# Patient Record
Sex: FEMALE | Race: WHITE | URBAN - METROPOLITAN AREA
[De-identification: names, ages, dates, MRNs, and addresses within clinical notes are randomized per-mention and may not be internally consistent; named-entity substitution may affect disease eponyms.]

---

## 2022-10-11 ENCOUNTER — APPOINTMENT (OUTPATIENT)
Dept: CT IMAGING | Age: 47
DRG: 494 | End: 2022-10-11

## 2022-10-11 ENCOUNTER — HOSPITAL ENCOUNTER (INPATIENT)
Age: 47
LOS: 2 days | Discharge: HOME | DRG: 494 | End: 2022-10-13
Attending: EMERGENCY MEDICINE | Admitting: ORTHOPAEDIC SURGERY

## 2022-10-11 ENCOUNTER — APPOINTMENT (OUTPATIENT)
Dept: GENERAL RADIOLOGY | Age: 47
DRG: 494 | End: 2022-10-11

## 2022-10-11 DIAGNOSIS — S82.401A CLOSED FRACTURE OF RIGHT TIBIA AND FIBULA, INITIAL ENCOUNTER: Primary | ICD-10-CM

## 2022-10-11 DIAGNOSIS — S82.201A CLOSED FRACTURE OF RIGHT TIBIA AND FIBULA, INITIAL ENCOUNTER: Primary | ICD-10-CM

## 2022-10-11 PROBLEM — S82.241G: Status: ACTIVE | Noted: 2022-10-11

## 2022-10-11 LAB
ANION GAP SERPL CALCULATED.3IONS-SCNC: 12 MMOL/L (ref 3–16)
BUN BLDV-MCNC: 5 MG/DL (ref 7–20)
CALCIUM SERPL-MCNC: 8.8 MG/DL (ref 8.3–10.6)
CHLORIDE BLD-SCNC: 103 MMOL/L (ref 99–110)
CO2: 24 MMOL/L (ref 21–32)
CREAT SERPL-MCNC: <0.5 MG/DL (ref 0.6–1.1)
GFR AFRICAN AMERICAN: >60
GFR NON-AFRICAN AMERICAN: >60
GLUCOSE BLD-MCNC: 104 MG/DL (ref 70–99)
HCT VFR BLD CALC: 34.4 % (ref 36–48)
HEMATOLOGY PATH CONSULT: YES
HEMOGLOBIN: 12 G/DL (ref 12–16)
MCH RBC QN AUTO: 38.3 PG (ref 26–34)
MCHC RBC AUTO-ENTMCNC: 34.7 G/DL (ref 31–36)
MCV RBC AUTO: 110.1 FL (ref 80–100)
PDW BLD-RTO: 13.4 % (ref 12.4–15.4)
PLATELET # BLD: 247 K/UL (ref 135–450)
PLATELET SLIDE REVIEW: ADEQUATE
PMV BLD AUTO: 7.4 FL (ref 5–10.5)
POTASSIUM SERPL-SCNC: 3.3 MMOL/L (ref 3.5–5.1)
RBC # BLD: 3.13 M/UL (ref 4–5.2)
SLIDE REVIEW: ABNORMAL
SODIUM BLD-SCNC: 139 MMOL/L (ref 136–145)
WBC # BLD: 8.2 K/UL (ref 4–11)

## 2022-10-11 PROCEDURE — 6360000002 HC RX W HCPCS: Performed by: NURSE PRACTITIONER

## 2022-10-11 PROCEDURE — 6370000000 HC RX 637 (ALT 250 FOR IP): Performed by: NURSE PRACTITIONER

## 2022-10-11 PROCEDURE — 99285 EMERGENCY DEPT VISIT HI MDM: CPT

## 2022-10-11 PROCEDURE — 1200000000 HC SEMI PRIVATE

## 2022-10-11 PROCEDURE — 85027 COMPLETE CBC AUTOMATED: CPT

## 2022-10-11 PROCEDURE — 80048 BASIC METABOLIC PNL TOTAL CA: CPT

## 2022-10-11 PROCEDURE — 96374 THER/PROPH/DIAG INJ IV PUSH: CPT

## 2022-10-11 PROCEDURE — 73700 CT LOWER EXTREMITY W/O DYE: CPT

## 2022-10-11 PROCEDURE — 73560 X-RAY EXAM OF KNEE 1 OR 2: CPT

## 2022-10-11 PROCEDURE — 29505 APPLICATION LONG LEG SPLINT: CPT

## 2022-10-11 PROCEDURE — 73610 X-RAY EXAM OF ANKLE: CPT

## 2022-10-11 PROCEDURE — 96376 TX/PRO/DX INJ SAME DRUG ADON: CPT

## 2022-10-11 RX ORDER — OXYCODONE HYDROCHLORIDE 5 MG/1
5 TABLET ORAL EVERY 4 HOURS PRN
Status: DISCONTINUED | OUTPATIENT
Start: 2022-10-11 | End: 2022-10-13 | Stop reason: HOSPADM

## 2022-10-11 RX ORDER — SODIUM CHLORIDE 9 MG/ML
INJECTION, SOLUTION INTRAVENOUS CONTINUOUS
Status: DISCONTINUED | OUTPATIENT
Start: 2022-10-11 | End: 2022-10-12

## 2022-10-11 RX ORDER — OXYCODONE HYDROCHLORIDE 10 MG/1
10 TABLET ORAL EVERY 4 HOURS PRN
Status: DISCONTINUED | OUTPATIENT
Start: 2022-10-11 | End: 2022-10-13 | Stop reason: HOSPADM

## 2022-10-11 RX ORDER — MORPHINE SULFATE 4 MG/ML
4 INJECTION, SOLUTION INTRAMUSCULAR; INTRAVENOUS ONCE
Status: COMPLETED | OUTPATIENT
Start: 2022-10-11 | End: 2022-10-11

## 2022-10-11 RX ORDER — ENOXAPARIN SODIUM 100 MG/ML
30 INJECTION SUBCUTANEOUS ONCE
Status: COMPLETED | OUTPATIENT
Start: 2022-10-11 | End: 2022-10-11

## 2022-10-11 RX ORDER — OXYCODONE HYDROCHLORIDE 5 MG/1
5 TABLET ORAL EVERY 4 HOURS PRN
Status: DISCONTINUED | OUTPATIENT
Start: 2022-10-11 | End: 2022-10-11 | Stop reason: SDUPTHER

## 2022-10-11 RX ORDER — SODIUM CHLORIDE 9 MG/ML
INJECTION, SOLUTION INTRAVENOUS CONTINUOUS
Status: DISCONTINUED | OUTPATIENT
Start: 2022-10-12 | End: 2022-10-12

## 2022-10-11 RX ORDER — ONDANSETRON 2 MG/ML
4 INJECTION INTRAMUSCULAR; INTRAVENOUS EVERY 6 HOURS PRN
Status: DISCONTINUED | OUTPATIENT
Start: 2022-10-11 | End: 2022-10-13 | Stop reason: HOSPADM

## 2022-10-11 RX ORDER — OXYCODONE HYDROCHLORIDE 10 MG/1
10 TABLET ORAL EVERY 4 HOURS PRN
Status: DISCONTINUED | OUTPATIENT
Start: 2022-10-11 | End: 2022-10-11 | Stop reason: SDUPTHER

## 2022-10-11 RX ORDER — MORPHINE SULFATE 2 MG/ML
2 INJECTION, SOLUTION INTRAMUSCULAR; INTRAVENOUS
Status: DISCONTINUED | OUTPATIENT
Start: 2022-10-11 | End: 2022-10-11 | Stop reason: SDUPTHER

## 2022-10-11 RX ORDER — OMEPRAZOLE 20 MG/1
20 CAPSULE, DELAYED RELEASE ORAL DAILY
COMMUNITY

## 2022-10-11 RX ORDER — MORPHINE SULFATE 2 MG/ML
2 INJECTION, SOLUTION INTRAMUSCULAR; INTRAVENOUS
Status: DISCONTINUED | OUTPATIENT
Start: 2022-10-11 | End: 2022-10-13 | Stop reason: HOSPADM

## 2022-10-11 RX ADMIN — MORPHINE SULFATE 4 MG: 4 INJECTION, SOLUTION INTRAMUSCULAR; INTRAVENOUS at 16:19

## 2022-10-11 RX ADMIN — ONDANSETRON 4 MG: 2 INJECTION INTRAMUSCULAR; INTRAVENOUS at 17:36

## 2022-10-11 RX ADMIN — MORPHINE SULFATE 2 MG: 2 INJECTION, SOLUTION INTRAMUSCULAR; INTRAVENOUS at 21:47

## 2022-10-11 RX ADMIN — ENOXAPARIN SODIUM 30 MG: 100 INJECTION SUBCUTANEOUS at 19:11

## 2022-10-11 RX ADMIN — OXYCODONE HYDROCHLORIDE 10 MG: 10 TABLET ORAL at 18:50

## 2022-10-11 RX ADMIN — MORPHINE SULFATE 4 MG: 4 INJECTION, SOLUTION INTRAMUSCULAR; INTRAVENOUS at 13:56

## 2022-10-11 ASSESSMENT — PAIN DESCRIPTION - ORIENTATION
ORIENTATION: RIGHT

## 2022-10-11 ASSESSMENT — PAIN DESCRIPTION - ONSET: ONSET: ON-GOING

## 2022-10-11 ASSESSMENT — PAIN - FUNCTIONAL ASSESSMENT
PAIN_FUNCTIONAL_ASSESSMENT: PREVENTS OR INTERFERES SOME ACTIVE ACTIVITIES AND ADLS
PAIN_FUNCTIONAL_ASSESSMENT: 0-10

## 2022-10-11 ASSESSMENT — PAIN DESCRIPTION - LOCATION
LOCATION: ANKLE

## 2022-10-11 ASSESSMENT — PAIN DESCRIPTION - FREQUENCY
FREQUENCY: CONTINUOUS
FREQUENCY: CONTINUOUS

## 2022-10-11 ASSESSMENT — PAIN DESCRIPTION - DESCRIPTORS
DESCRIPTORS: THROBBING
DESCRIPTORS: THROBBING;STABBING
DESCRIPTORS: ACHING;DISCOMFORT;SHARP
DESCRIPTORS: THROBBING
DESCRIPTORS: THROBBING

## 2022-10-11 ASSESSMENT — PAIN SCALES - GENERAL
PAINLEVEL_OUTOF10: 8
PAINLEVEL_OUTOF10: 8
PAINLEVEL_OUTOF10: 9
PAINLEVEL_OUTOF10: 10
PAINLEVEL_OUTOF10: 8
PAINLEVEL_OUTOF10: 10

## 2022-10-11 ASSESSMENT — PAIN DESCRIPTION - PAIN TYPE
TYPE: ACUTE PAIN
TYPE: ACUTE PAIN

## 2022-10-11 ASSESSMENT — PAIN SCALES - WONG BAKER: WONGBAKER_NUMERICALRESPONSE: 0

## 2022-10-11 NOTE — ED NOTES
Patient vomited up small amount of water, zofran given per order      Saranya Valdes, RN  10/11/22 5777

## 2022-10-11 NOTE — ED TRIAGE NOTES
Ems states patient fell yesterday and today ankle is swollen and blue, good pms.  100mcg of fentanyl given en route IM

## 2022-10-11 NOTE — CARE COORDINATION
INITIAL CASE MANAGEMENT ASSESSMENT    Reviewed chart, met with patient to assess possible discharge needs. Explained Case Management role/services. Living Situation: Lives alone in house with 1 YOSELIN has medium dog. Friend watching dog. ADLs: Independent PTA, Massage Therapist.     DME: Done    PT/OT Recs: TBD     Active Services: None     Transportation: Active      Medications: [facility]    PCP: may need      HD/PD: N/A    PLAN/COMMENTS: return home. Completed Advanced Directives. Provided Home care and SNF list.      The Plan for Transition of Care is related to the following treatment goals: return home    The Patient and/or patient representative Friend was provided with a choice of provider and agrees   with the discharge plan. [x] Yes [] No    Freedom of choice list was provided with basic dialogue that supports the patient's individualized plan of care/goals, treatment preferences and shares the quality data associated with the providers. [x] Yes [] No    SW/CM provided contact information for patient or family to call with any questions. SW/CM will follow and assist as needed.

## 2022-10-11 NOTE — ACP (ADVANCE CARE PLANNING)
Advance Care Planning     Advance Care Planning Activator (Inpatient)  Conversation Note      Date of ACP Conversation: 10/11/2022     Conversation Conducted with: Patient with Decision Making Capacity    ACP Activator: Ez Guerreros, [address]:     Current Designated Health Care Decision Maker:     Primary Decision Maker: Colin Martinsr - Parent - [phone]    Secondary Decision Maker: Natalie Lennon - Parent - [phone]    Today we documented Decision Maker(s) consistent with ACP documents on file.     Length of ACP Conversation in minutes:      Conversation Outcomes:  [x] ACP discussion completed  [] Existing advance directive reviewed with patient; no changes to patient's previously recorded wishes  [x] New Advance Directive completed  [] Portable Do Not Rescitate prepared for Provider review and signature  [] POLST/POST/MOLST/MOST prepared for Provider review and signature      Follow-up plan:    [] Schedule follow-up conversation to continue planning  [] Referred individual to Provider for additional questions/concerns   [] Advised patient/agent/surrogate to review completed ACP document and update if needed with changes in condition, patient preferences or care setting    [] This note routed to one or more involved healthcare providers

## 2022-10-11 NOTE — ED PROVIDER NOTES
[address]  [address] [address]  Dept: [phone]  Loc: [address] COMPLAINT    Chief Complaint   Patient presents with    Fall       HPI    Cory Dalton is a 52 y.o. female who presents to department with complaints of right ankle pain. Patient states that she was walking and missed a curb stepping up. She did not fall but she rolled the ankle. She had pain in it but was able to ambulate. She went home and at 3:00 in the morning she got up to go to the bathroom and was having severe pain and difficulty walking. This morning she is unable to ambulate at all on it. She has never had injuries to this ankle before. She denies numbness or paresthesias to the extremity. She denies any other injury. REVIEW OF SYSTEMS    Neurologic: no LOC, no Head injury  Cardiac: No Chest Pain, no syncope  Respiratory: No difficulty breathing  GI: No abdominal pain  Musculoskeletal: see HPI  All other systems reviewed and are negative. PAST MEDICAL & SURGICAL HISTORY    No past medical history on file. No past surgical history on file. CURRENT MEDICATIONS  (may include discharge medications prescribed in the ED)      ALLERGIES    Allergies   Allergen Reactions    Pcn [Penicillins]        SOCIAL & FAMILY HISTORY    Social History     Socioeconomic History    Marital status: Single     Spouse name: None    Number of children: None    Years of education: None    Highest education level: None   Tobacco Use    Smoking status: Never    Smokeless tobacco: Never     No family history on file.     PHYSICAL EXAM    VITAL SIGNS: /87   Pulse 89   Temp 97.9 °F (36.6 °C) (Oral)   Resp 14   Ht 5' 3\" (1.6 m)   Wt 152 lb 8.9 oz (69.2 kg)   LMP 09/21/2022 (Approximate)   SpO2 95%   BMI 27.02 kg/m²    Constitutional:  Well developed, well nourished, no acute distress   HENT: atraumatic, no trismus  Neck: supple, no JVD, no posterior neck tenderness  Respiratory:  Lungs clear to auscultation bilaterally, no retractions   Cardiovascular:  regular rate, no murmurs  GI:  Soft, nontender, normal bowel sounds  Musculoskeletal:  No edema, patient is unable to flex and extend the right ankle. No range of motion due to pain. She is able to wiggle left toes. No obvious deformity to the knee. Is no pain with range of motion to the knee. No obvious deformity to the ankle but she does have moderate amount soft tissue swelling of circumferential to the ankle extending into the lower leg  Vascular: Posttibial pulses 2+ equal bilaterally. Brisk capillary refill to the toes. The extremity is warm  Integument:  Well hydrated, and is intact but she does have a large purple ecchymosis developing in the right ankle and lower extremity  Neurologic:  Alert & oriented, no slurred speech  Psych: Pleasant affect, no hallucinations    RADIOLOGY  (interpreted by the radiologist)  XR KNEE RIGHT (1-2 VIEWS)   Preliminary Result   No evidence of acute fracture. Follow-up examination recommended in 7-10 days   if clinically indicated. XR ANKLE RIGHT (MIN 3 VIEWS)   Final Result   Acute comminuted and mildly displaced fractures of the mid to distal tibia as   well as the distal fibula with overlying soft tissue swelling. CT ANKLE RIGHT WO CONTRAST    (Results Pending)       ED COURSE & MEDICAL DECISION MAKING    Pertinent studies reviewed and interpreted.  (See chart for details)  See chart for details of medications ordered    Medications   enoxaparin Sodium (LOVENOX) injection 30 mg (has no administration in time range)   morphine (PF) injection 2 mg (has no administration in time range)   ondansetron (ZOFRAN) injection 4 mg (has no administration in time range)   0.9 % sodium chloride infusion (has no administration in time range)   oxyCODONE (ROXICODONE) immediate release tablet 5 mg (has no administration in time range)     Or   oxyCODONE HCl (OXY-IR) immediate release tablet 10 mg (has no administration in time range)   morphine (PF) injection 4 mg (4 mg IntraVENous Given 10/11/22 1356)   morphine (PF) injection 4 mg (4 mg IntraVENous Given 10/11/22 1619)     This patient was seen and evaluated by myself and my attending physician Dr. Leonel Clemente    Differential Diagnosis: Fracture, Dislocation, ligamentous injury, other soft tissue injury, visceral injury, neurologic injury, other. Patient is afebrile and nontoxic in appearance. Plain films as above. No evidence of neurovascular injury on exam.    She was given fentanyl in route via EMS. It was ineffective. On arrival I ordered for her to have morphine. It did help some. She was given another round of morphine. She can eat today. N.p.o. after midnight. I spoke with orthopedics who saw the patient in the ED. Plan for surgery tomorrow per Dr. Denise Yañez. The splint was placed by my attending physician Dr. Leonel Clemente. She remained neurovascularly intact after the splint was applied. Please see his note for details of the splint application. She was updated on the plan of care and she agrees. FINAL IMPRESSION    1.  Closed fracture of right tibia and fibula, initial encounter        PLAN  Admission      (Please note that this note was completed with a voice recognition program.  Every attempt was made to edit the dictations, but inevitably there remain words that are mis-transcribed.)            Susy Campbell, NEISHA - CNP  10/11/22 [address] [facility], APRN - CNP  10/11/22 4875

## 2022-10-11 NOTE — ED TRIAGE NOTES
Patient states she fell of a curb while walking right ankle pain and swelling.  Patient states she feels the bones moving in her ankle

## 2022-10-11 NOTE — PROGRESS NOTES
Pharmacy Medication Reconciliation Note     List of medications patient is currently taking is complete. Source of information:   1. Conversation with patient   2. EMR    Notes regarding home medications:   1. Patient states the only medication she takes is an over the counter acid reducer. Patient can't remember the name or dose - just that the instructions say to take once daily.  Omeprazole 20 mg daily added to list.      Janel Colmenares PharmD  10/11/2022 5:12 PM

## 2022-10-11 NOTE — ED PROVIDER NOTES
identity confirmed:  Verbally with patient and arm band  Pre-procedure details:     Distal neurologic exam:  Normal    Distal perfusion: distal pulses strong    Procedure details:     Location:  Leg    Leg location:  L lower leg    Splint type:  Long leg and ankle stirrup    Supplies:  Elastic bandage, fiberglass and cotton padding    Attestation: Splint applied and adjusted personally by me    Post-procedure details:     Distal neurologic exam:  Normal    Distal perfusion: distal pulses strong      Procedure completion:  Tolerated well, no immediate complications    Post-procedure imaging: not applicable      1.  Closed fracture of right tibia and fibula, initial encounter      Disposition:  Admit        (Please note that portions of this note were completed with a voice recognition program.  Efforts were made to edit the dictations but occasionally words are mis-transcribed.)    Moncho Puente MD  Attending Emergency Physician        Ruth Fernandez MD  10/11/22 [address], [place]  10/11/22 [address], [place]  10/11/22 0944

## 2022-10-11 NOTE — CONSULTS
[facility] Orthopedic Surgery  Consult Note    This patient is seen in consultation at the request of Cleo Frankel NP    Reason for Consult:  right tib/fib shaft fx, right ankle fx    CHIEF COMPLAINT:  right leg pain    History Obtained From:  patient, electronic medical record    HISTORY OF PRESENT ILLNESS:    The patient is a 52 y.o. female who presents with right ankle pain. Pain is described in right lower leg and ankle and with the intensity of severe. Pain is described as aching, throbbing. Discomfort is constant. She reports walking and missing the curb step and rolling the right ankle. She went home but awoke during the night with severe right lower leg pain and came to ER. She is alert and oriented. NO other complaints. She is alert and oriented and pleasant. Having tremors. Past Medical History:    No past medical history on file. Past Surgical History:    No past surgical history on file. Social History     Tobacco Use    Smoking status: Not on file    Smokeless tobacco: Not on file   Substance Use Topics    Alcohol use: Not on file       No family history on file. Current Medications:   No current facility-administered medications for this encounter. Allergies:  ALLERGY@    REVIEW OF SYSTEMS:    CONSTITUTIONAL:  negative for  fevers, chills, and malaise  MUSCULOSKELETAL:  positive for  myalgias, arthralgias, and pain  All other ROS reviewed in chart or with patient or family and are grossly negative. PHYSICAL EXAM:    VITALS:  /87   Pulse 89   Temp 97.9 °F (36.6 °C) (Oral)   Resp 14   Ht 5' 3\" (1.6 m)   Wt 152 lb 8.9 oz (69.2 kg)   LMP 09/21/2022 (Approximate)   SpO2 95%   BMI 27.02 kg/m²     MUSCULOSKELETAL:  right foot NVI. Wiggles toes to command. Able to plantarflex and dorsiflex ankle Pedal pulses are palpable. Right lower leg with swelling and bruising noted. Right ankle with swelling and bruising.    NEUROLOGIC:   Sensory:    Touch: Right Lower Extremity:  normal                 Skin warm and dry  Resp deep and easy  Abdomen soft and round  Pulse is with regular rate and rhythm    DATA:    CBC:   Lab Results   Component Value Date/Time    WBC 7.0 02/21/2010 10:55 AM    RBC 3.82 02/21/2010 10:55 AM    HGB 13.4 02/21/2010 10:55 AM    HCT 39.4 02/21/2010 10:55 AM    .2 02/21/2010 10:55 AM    MCH 35.1 02/21/2010 10:55 AM    MCHC 34.1 02/21/2010 10:55 AM    RDW 10.7 02/21/2010 10:55 AM     02/21/2010 10:55 AM    MPV 7.1 02/21/2010 10:55 AM     WBC:    Lab Results   Component Value Date/Time    WBC 7.0 02/21/2010 10:55 AM     PT/INR:  No results found for: PROTIME, INR  PTT:  No results found for: APTT[APTT  Radiology Review:    Narrative   EXAMINATION:   2 XRAY VIEWS OF THE RIGHT KNEE       10/11/2022 2:24 pm       COMPARISON:   None       HISTORY:   ORDERING SYSTEM PROVIDED HISTORY: pain after tripping   TECHNOLOGIST PROVIDED HISTORY:   Reason for exam:->pain after tripping   Reason for Exam: fell       FINDINGS:   Artifact present on the study associated with patient clothing/bedding   material.  With this limitation in mind, no significant bone or joint   abnormality is identified. No evidence of significant joint effusion. No   evidence of acute fracture or dislocation. Impression   No evidence of acute fracture. Follow-up examination recommended in 7-10 days   if clinically indicated. CT right ankle noted minimally displaced tibial fracture, intrarticular. IMPRESSION/RECOMMENDATIONS:    Fall yesterday  Right lower leg and ankle pain  Right tib/fib shaft fx  Right medial malleolar fx, pilon fracture per CT  NPO after MN for ORIF right tibial  Lovenox x1 today. Hold for surgery Weds with Dr Marcello Cohn at noon. .  Discussed with Dr Colton Canchola.  REferred by Dr Miquel Barraza, APRN - CNP  10/11/2022  3:41 PM

## 2022-10-12 ENCOUNTER — APPOINTMENT (OUTPATIENT)
Dept: GENERAL RADIOLOGY | Age: 47
DRG: 494 | End: 2022-10-12

## 2022-10-12 ENCOUNTER — ANESTHESIA (OUTPATIENT)
Dept: OPERATING ROOM | Age: 47
DRG: 494 | End: 2022-10-12

## 2022-10-12 ENCOUNTER — ANESTHESIA EVENT (OUTPATIENT)
Dept: OPERATING ROOM | Age: 47
DRG: 494 | End: 2022-10-12

## 2022-10-12 PROBLEM — S82.871A CLOSED DISPLACED PILON FRACTURE OF RIGHT TIBIA: Status: ACTIVE | Noted: 2022-10-12

## 2022-10-12 PROBLEM — S82.251A CLOSED DISPLACED COMMINUTED FRACTURE OF SHAFT OF RIGHT TIBIA: Status: ACTIVE | Noted: 2022-10-11

## 2022-10-12 PROBLEM — S82.401A CLOSED FRACTURE OF RIGHT FIBULA AND TIBIA: Status: ACTIVE | Noted: 2022-10-11

## 2022-10-12 PROBLEM — F17.200 CURRENT SMOKER: Status: ACTIVE | Noted: 2022-10-12

## 2022-10-12 LAB
HEMATOLOGY PATH CONSULT: NORMAL
PREGNANCY, URINE: NEGATIVE

## 2022-10-12 PROCEDURE — 3700000001 HC ADD 15 MINUTES (ANESTHESIA): Performed by: ORTHOPAEDIC SURGERY

## 2022-10-12 PROCEDURE — 2500000003 HC RX 250 WO HCPCS: Performed by: NURSE PRACTITIONER

## 2022-10-12 PROCEDURE — 6360000002 HC RX W HCPCS: Performed by: ANESTHESIOLOGY

## 2022-10-12 PROCEDURE — 3600000004 HC SURGERY LEVEL 4 BASE: Performed by: ORTHOPAEDIC SURGERY

## 2022-10-12 PROCEDURE — 84703 CHORIONIC GONADOTROPIN ASSAY: CPT

## 2022-10-12 PROCEDURE — 3700000000 HC ANESTHESIA ATTENDED CARE: Performed by: ORTHOPAEDIC SURGERY

## 2022-10-12 PROCEDURE — 2500000003 HC RX 250 WO HCPCS

## 2022-10-12 PROCEDURE — 6360000002 HC RX W HCPCS: Performed by: ORTHOPAEDIC SURGERY

## 2022-10-12 PROCEDURE — 2580000003 HC RX 258: Performed by: ORTHOPAEDIC SURGERY

## 2022-10-12 PROCEDURE — 3600000014 HC SURGERY LEVEL 4 ADDTL 15MIN: Performed by: ORTHOPAEDIC SURGERY

## 2022-10-12 PROCEDURE — 2720000010 HC SURG SUPPLY STERILE: Performed by: ORTHOPAEDIC SURGERY

## 2022-10-12 PROCEDURE — A4217 STERILE WATER/SALINE, 500 ML: HCPCS | Performed by: ORTHOPAEDIC SURGERY

## 2022-10-12 PROCEDURE — 99222 1ST HOSP IP/OBS MODERATE 55: CPT | Performed by: ORTHOPAEDIC SURGERY

## 2022-10-12 PROCEDURE — 6360000002 HC RX W HCPCS

## 2022-10-12 PROCEDURE — C1769 GUIDE WIRE: HCPCS | Performed by: ORTHOPAEDIC SURGERY

## 2022-10-12 PROCEDURE — 6360000002 HC RX W HCPCS: Performed by: NURSE PRACTITIONER

## 2022-10-12 PROCEDURE — 73600 X-RAY EXAM OF ANKLE: CPT

## 2022-10-12 PROCEDURE — 2500000003 HC RX 250 WO HCPCS: Performed by: ORTHOPAEDIC SURGERY

## 2022-10-12 PROCEDURE — 6370000000 HC RX 637 (ALT 250 FOR IP): Performed by: ORTHOPAEDIC SURGERY

## 2022-10-12 PROCEDURE — 7100000001 HC PACU RECOVERY - ADDTL 15 MIN: Performed by: ORTHOPAEDIC SURGERY

## 2022-10-12 PROCEDURE — 2580000003 HC RX 258: Performed by: NURSE PRACTITIONER

## 2022-10-12 PROCEDURE — 7100000000 HC PACU RECOVERY - FIRST 15 MIN: Performed by: ORTHOPAEDIC SURGERY

## 2022-10-12 PROCEDURE — 3209999900 FLUORO FOR SURGICAL PROCEDURES

## 2022-10-12 PROCEDURE — 27758 TREATMENT OF TIBIA FRACTURE: CPT | Performed by: ORTHOPAEDIC SURGERY

## 2022-10-12 PROCEDURE — C1713 ANCHOR/SCREW BN/BN,TIS/BN: HCPCS | Performed by: ORTHOPAEDIC SURGERY

## 2022-10-12 PROCEDURE — 0QSG04Z REPOSITION RIGHT TIBIA WITH INTERNAL FIXATION DEVICE, OPEN APPROACH: ICD-10-PCS | Performed by: ORTHOPAEDIC SURGERY

## 2022-10-12 PROCEDURE — 2060000000 HC ICU INTERMEDIATE R&B

## 2022-10-12 PROCEDURE — 6370000000 HC RX 637 (ALT 250 FOR IP): Performed by: NURSE PRACTITIONER

## 2022-10-12 PROCEDURE — 2709999900 HC NON-CHARGEABLE SUPPLY: Performed by: ORTHOPAEDIC SURGERY

## 2022-10-12 PROCEDURE — 27827 TREAT LOWER LEG FRACTURE: CPT | Performed by: ORTHOPAEDIC SURGERY

## 2022-10-12 DEVICE — IMPLANTABLE DEVICE: Type: IMPLANTABLE DEVICE | Site: ANKLE | Status: FUNCTIONAL

## 2022-10-12 DEVICE — KREULOCK COMPRESSION SCREW SS 2.7X36MM: Type: IMPLANTABLE DEVICE | Site: ANKLE | Status: FUNCTIONAL

## 2022-10-12 DEVICE — SCREW BONE L40MM DIA2.7MM CORT ANK S STL NONLOCKING LO PROF: Type: IMPLANTABLE DEVICE | Site: ANKLE | Status: FUNCTIONAL

## 2022-10-12 DEVICE — SCREW BNE L24MM DIA3.5MM CORT ANK S STL NONLOCKING LO PROF: Type: IMPLANTABLE DEVICE | Site: ANKLE | Status: FUNCTIONAL

## 2022-10-12 DEVICE — KREULOCK COMPRESSION SCREW SS 2.7X34MM: Type: IMPLANTABLE DEVICE | Site: ANKLE | Status: FUNCTIONAL

## 2022-10-12 DEVICE — SCREW BONE L42MM DIA2.7MM CORT ANK S STL NONLOCKING LO PROF: Type: IMPLANTABLE DEVICE | Site: ANKLE | Status: FUNCTIONAL

## 2022-10-12 DEVICE — SCREW BNE L26MM DIA3.5MM CORT ANK S STL NONLOCKING LO PROF: Type: IMPLANTABLE DEVICE | Site: ANKLE | Status: FUNCTIONAL

## 2022-10-12 DEVICE — KREULOCK COMPRESSION SCREW SS 2.7X40MM: Type: IMPLANTABLE DEVICE | Site: ANKLE | Status: FUNCTIONAL

## 2022-10-12 RX ORDER — LIDOCAINE HYDROCHLORIDE 20 MG/ML
INJECTION, SOLUTION EPIDURAL; INFILTRATION; INTRACAUDAL; PERINEURAL PRN
Status: DISCONTINUED | OUTPATIENT
Start: 2022-10-12 | End: 2022-10-12 | Stop reason: SDUPTHER

## 2022-10-12 RX ORDER — SODIUM CHLORIDE 0.9 % (FLUSH) 0.9 %
5-40 SYRINGE (ML) INJECTION EVERY 12 HOURS SCHEDULED
Status: DISCONTINUED | OUTPATIENT
Start: 2022-10-12 | End: 2022-10-13 | Stop reason: HOSPADM

## 2022-10-12 RX ORDER — SODIUM CHLORIDE 0.9 % (FLUSH) 0.9 %
5-40 SYRINGE (ML) INJECTION PRN
Status: DISCONTINUED | OUTPATIENT
Start: 2022-10-12 | End: 2022-10-12 | Stop reason: HOSPADM

## 2022-10-12 RX ORDER — DEXAMETHASONE SODIUM PHOSPHATE 4 MG/ML
INJECTION, SOLUTION INTRA-ARTICULAR; INTRALESIONAL; INTRAMUSCULAR; INTRAVENOUS; SOFT TISSUE PRN
Status: DISCONTINUED | OUTPATIENT
Start: 2022-10-12 | End: 2022-10-12 | Stop reason: SDUPTHER

## 2022-10-12 RX ORDER — FENTANYL CITRATE 50 UG/ML
INJECTION, SOLUTION INTRAMUSCULAR; INTRAVENOUS PRN
Status: DISCONTINUED | OUTPATIENT
Start: 2022-10-12 | End: 2022-10-12 | Stop reason: SDUPTHER

## 2022-10-12 RX ORDER — ONDANSETRON 2 MG/ML
INJECTION INTRAMUSCULAR; INTRAVENOUS PRN
Status: DISCONTINUED | OUTPATIENT
Start: 2022-10-12 | End: 2022-10-12 | Stop reason: SDUPTHER

## 2022-10-12 RX ORDER — ONDANSETRON 2 MG/ML
4 INJECTION INTRAMUSCULAR; INTRAVENOUS
Status: DISCONTINUED | OUTPATIENT
Start: 2022-10-12 | End: 2022-10-12 | Stop reason: HOSPADM

## 2022-10-12 RX ORDER — SODIUM CHLORIDE 0.9 % (FLUSH) 0.9 %
5-40 SYRINGE (ML) INJECTION EVERY 12 HOURS SCHEDULED
Status: DISCONTINUED | OUTPATIENT
Start: 2022-10-12 | End: 2022-10-12 | Stop reason: HOSPADM

## 2022-10-12 RX ORDER — PHENYLEPHRINE HCL IN 0.9% NACL 1 MG/10 ML
SYRINGE (ML) INTRAVENOUS PRN
Status: DISCONTINUED | OUTPATIENT
Start: 2022-10-12 | End: 2022-10-12 | Stop reason: SDUPTHER

## 2022-10-12 RX ORDER — PROPOFOL 10 MG/ML
INJECTION, EMULSION INTRAVENOUS PRN
Status: DISCONTINUED | OUTPATIENT
Start: 2022-10-12 | End: 2022-10-12 | Stop reason: SDUPTHER

## 2022-10-12 RX ORDER — MIDAZOLAM HYDROCHLORIDE 1 MG/ML
INJECTION INTRAMUSCULAR; INTRAVENOUS PRN
Status: DISCONTINUED | OUTPATIENT
Start: 2022-10-12 | End: 2022-10-12 | Stop reason: SDUPTHER

## 2022-10-12 RX ORDER — CLINDAMYCIN PHOSPHATE 900 MG/50ML
900 INJECTION INTRAVENOUS EVERY 8 HOURS
Status: DISCONTINUED | OUTPATIENT
Start: 2022-10-12 | End: 2022-10-12

## 2022-10-12 RX ORDER — SODIUM CHLORIDE 9 MG/ML
INJECTION, SOLUTION INTRAVENOUS PRN
Status: DISCONTINUED | OUTPATIENT
Start: 2022-10-12 | End: 2022-10-12 | Stop reason: HOSPADM

## 2022-10-12 RX ORDER — BUPIVACAINE HYDROCHLORIDE 5 MG/ML
INJECTION, SOLUTION EPIDURAL; INTRACAUDAL
Status: COMPLETED | OUTPATIENT
Start: 2022-10-12 | End: 2022-10-12

## 2022-10-12 RX ORDER — SODIUM CHLORIDE 450 MG/100ML
INJECTION, SOLUTION INTRAVENOUS CONTINUOUS
Status: DISCONTINUED | OUTPATIENT
Start: 2022-10-12 | End: 2022-10-13 | Stop reason: HOSPADM

## 2022-10-12 RX ORDER — CLINDAMYCIN PHOSPHATE 900 MG/50ML
900 INJECTION INTRAVENOUS EVERY 8 HOURS
Status: COMPLETED | OUTPATIENT
Start: 2022-10-12 | End: 2022-10-13

## 2022-10-12 RX ORDER — FENTANYL CITRATE 50 UG/ML
25 INJECTION, SOLUTION INTRAMUSCULAR; INTRAVENOUS EVERY 5 MIN PRN
Status: DISCONTINUED | OUTPATIENT
Start: 2022-10-12 | End: 2022-10-12 | Stop reason: HOSPADM

## 2022-10-12 RX ORDER — SODIUM CHLORIDE 0.9 % (FLUSH) 0.9 %
5-40 SYRINGE (ML) INJECTION PRN
Status: DISCONTINUED | OUTPATIENT
Start: 2022-10-12 | End: 2022-10-13 | Stop reason: HOSPADM

## 2022-10-12 RX ORDER — CLINDAMYCIN PHOSPHATE 900 MG/50ML
900 INJECTION INTRAVENOUS ONCE
Status: COMPLETED | OUTPATIENT
Start: 2022-10-12 | End: 2022-10-12

## 2022-10-12 RX ORDER — SODIUM CHLORIDE 9 MG/ML
INJECTION, SOLUTION INTRAVENOUS PRN
Status: DISCONTINUED | OUTPATIENT
Start: 2022-10-12 | End: 2022-10-13 | Stop reason: HOSPADM

## 2022-10-12 RX ORDER — MORPHINE SULFATE 2 MG/ML
2 INJECTION, SOLUTION INTRAMUSCULAR; INTRAVENOUS
Status: DISCONTINUED | OUTPATIENT
Start: 2022-10-12 | End: 2022-10-13 | Stop reason: HOSPADM

## 2022-10-12 RX ADMIN — OXYCODONE HYDROCHLORIDE 10 MG: 10 TABLET ORAL at 16:11

## 2022-10-12 RX ADMIN — FENTANYL CITRATE 50 MCG: 50 INJECTION INTRAMUSCULAR; INTRAVENOUS at 13:42

## 2022-10-12 RX ADMIN — FENTANYL CITRATE 25 MCG: 50 INJECTION, SOLUTION INTRAMUSCULAR; INTRAVENOUS at 15:06

## 2022-10-12 RX ADMIN — SODIUM CHLORIDE: 4.5 INJECTION, SOLUTION INTRAVENOUS at 16:14

## 2022-10-12 RX ADMIN — ONDANSETRON 4 MG: 2 INJECTION INTRAMUSCULAR; INTRAVENOUS at 12:32

## 2022-10-12 RX ADMIN — SODIUM CHLORIDE: 4.5 INJECTION, SOLUTION INTRAVENOUS at 20:12

## 2022-10-12 RX ADMIN — LIDOCAINE HYDROCHLORIDE 60 MG: 20 INJECTION, SOLUTION EPIDURAL; INFILTRATION; INTRACAUDAL; PERINEURAL at 12:29

## 2022-10-12 RX ADMIN — MORPHINE SULFATE 2 MG: 2 INJECTION, SOLUTION INTRAMUSCULAR; INTRAVENOUS at 01:46

## 2022-10-12 RX ADMIN — Medication 100 MCG: at 12:37

## 2022-10-12 RX ADMIN — OXYCODONE HYDROCHLORIDE 10 MG: 10 TABLET ORAL at 00:34

## 2022-10-12 RX ADMIN — CLINDAMYCIN PHOSPHATE 900 MG: 900 INJECTION, SOLUTION INTRAVENOUS at 12:31

## 2022-10-12 RX ADMIN — DEXAMETHASONE SODIUM PHOSPHATE 8 MG: 4 INJECTION, SOLUTION INTRAMUSCULAR; INTRAVENOUS at 12:32

## 2022-10-12 RX ADMIN — SODIUM CHLORIDE: 9 INJECTION, SOLUTION INTRAVENOUS at 04:38

## 2022-10-12 RX ADMIN — SODIUM CHLORIDE: 9 INJECTION, SOLUTION INTRAVENOUS at 13:59

## 2022-10-12 RX ADMIN — OXYCODONE HYDROCHLORIDE 10 MG: 10 TABLET ORAL at 09:39

## 2022-10-12 RX ADMIN — PROPOFOL 160 MG: 10 INJECTION, EMULSION INTRAVENOUS at 12:29

## 2022-10-12 RX ADMIN — CLINDAMYCIN PHOSPHATE 900 MG: 900 INJECTION, SOLUTION INTRAVENOUS at 20:13

## 2022-10-12 RX ADMIN — MORPHINE SULFATE 2 MG: 2 INJECTION, SOLUTION INTRAMUSCULAR; INTRAVENOUS at 07:47

## 2022-10-12 RX ADMIN — MIDAZOLAM 2 MG: 1 INJECTION INTRAMUSCULAR; INTRAVENOUS at 12:22

## 2022-10-12 RX ADMIN — HYDROMORPHONE HYDROCHLORIDE 0.5 MG: 1 INJECTION, SOLUTION INTRAMUSCULAR; INTRAVENOUS; SUBCUTANEOUS at 14:15

## 2022-10-12 RX ADMIN — Medication 100 MCG: at 13:35

## 2022-10-12 RX ADMIN — HYDROMORPHONE HYDROCHLORIDE 0.5 MG: 1 INJECTION, SOLUTION INTRAMUSCULAR; INTRAVENOUS; SUBCUTANEOUS at 14:24

## 2022-10-12 RX ADMIN — ASPIRIN 325 MG: 325 TABLET, DELAYED RELEASE ORAL at 20:03

## 2022-10-12 RX ADMIN — Medication 100 MCG: at 13:54

## 2022-10-12 RX ADMIN — OXYCODONE HYDROCHLORIDE 10 MG: 10 TABLET ORAL at 20:16

## 2022-10-12 RX ADMIN — OXYCODONE HYDROCHLORIDE 10 MG: 10 TABLET ORAL at 04:37

## 2022-10-12 RX ADMIN — FENTANYL CITRATE 50 MCG: 50 INJECTION INTRAMUSCULAR; INTRAVENOUS at 12:26

## 2022-10-12 RX ADMIN — SODIUM CHLORIDE, PRESERVATIVE FREE 10 ML: 5 INJECTION INTRAVENOUS at 20:04

## 2022-10-12 RX ADMIN — MORPHINE SULFATE 2 MG: 2 INJECTION, SOLUTION INTRAMUSCULAR; INTRAVENOUS at 10:50

## 2022-10-12 RX ADMIN — Medication 100 MCG: at 12:52

## 2022-10-12 RX ADMIN — MORPHINE SULFATE 2 MG: 2 INJECTION, SOLUTION INTRAMUSCULAR; INTRAVENOUS at 23:49

## 2022-10-12 ASSESSMENT — PAIN SCALES - GENERAL
PAINLEVEL_OUTOF10: 5
PAINLEVEL_OUTOF10: 9
PAINLEVEL_OUTOF10: 5
PAINLEVEL_OUTOF10: 6
PAINLEVEL_OUTOF10: 8
PAINLEVEL_OUTOF10: 8
PAINLEVEL_OUTOF10: 10
PAINLEVEL_OUTOF10: 8
PAINLEVEL_OUTOF10: 10
PAINLEVEL_OUTOF10: 0
PAINLEVEL_OUTOF10: 8
PAINLEVEL_OUTOF10: 5
PAINLEVEL_OUTOF10: 2
PAINLEVEL_OUTOF10: 10
PAINLEVEL_OUTOF10: 8
PAINLEVEL_OUTOF10: 10
PAINLEVEL_OUTOF10: 7
PAINLEVEL_OUTOF10: 6

## 2022-10-12 ASSESSMENT — PAIN SCALES - WONG BAKER
WONGBAKER_NUMERICALRESPONSE: 0
WONGBAKER_NUMERICALRESPONSE: 0
WONGBAKER_NUMERICALRESPONSE: 6
WONGBAKER_NUMERICALRESPONSE: 0

## 2022-10-12 ASSESSMENT — PAIN - FUNCTIONAL ASSESSMENT
PAIN_FUNCTIONAL_ASSESSMENT: PREVENTS OR INTERFERES SOME ACTIVE ACTIVITIES AND ADLS
PAIN_FUNCTIONAL_ASSESSMENT: PREVENTS OR INTERFERES SOME ACTIVE ACTIVITIES AND ADLS
PAIN_FUNCTIONAL_ASSESSMENT: 0-10
PAIN_FUNCTIONAL_ASSESSMENT: PREVENTS OR INTERFERES SOME ACTIVE ACTIVITIES AND ADLS

## 2022-10-12 ASSESSMENT — PAIN DESCRIPTION - PAIN TYPE
TYPE: SURGICAL PAIN
TYPE: ACUTE PAIN
TYPE: SURGICAL PAIN
TYPE: ACUTE PAIN
TYPE: ACUTE PAIN

## 2022-10-12 ASSESSMENT — PAIN DESCRIPTION - LOCATION
LOCATION: ANKLE;LEG
LOCATION: LEG
LOCATION: ANKLE
LOCATION: ANKLE
LOCATION: LEG
LOCATION: ANKLE
LOCATION: LEG
LOCATION: ANKLE;LEG
LOCATION: ANKLE

## 2022-10-12 ASSESSMENT — PAIN DESCRIPTION - FREQUENCY
FREQUENCY: CONTINUOUS

## 2022-10-12 ASSESSMENT — PAIN DESCRIPTION - DESCRIPTORS
DESCRIPTORS: ACHING;DISCOMFORT
DESCRIPTORS: ACHING;DISCOMFORT
DESCRIPTORS: DISCOMFORT;THROBBING;SHARP
DESCRIPTORS: STABBING
DESCRIPTORS: DISCOMFORT
DESCRIPTORS: STABBING
DESCRIPTORS: THROBBING;SQUEEZING
DESCRIPTORS: THROBBING
DESCRIPTORS: ACHING;DISCOMFORT;SHARP

## 2022-10-12 ASSESSMENT — PAIN DESCRIPTION - ORIENTATION
ORIENTATION: RIGHT

## 2022-10-12 ASSESSMENT — ENCOUNTER SYMPTOMS: SHORTNESS OF BREATH: 0

## 2022-10-12 ASSESSMENT — LIFESTYLE VARIABLES: SMOKING_STATUS: 1

## 2022-10-12 ASSESSMENT — PAIN DESCRIPTION - ONSET
ONSET: ON-GOING
ONSET: ON-GOING

## 2022-10-12 NOTE — PROGRESS NOTES
4 Eyes Admission Assessment      I agree as the admission nurse that 2 RN's have performed a thorough Head to Toe Skin Assessment on the patient. ALL assessment sites listed below have been assessed on admission. Patient right leg is splinted. Surgery to occur tomorrow. Areas assessed by both nurses:   [x]   Head, Face, and Ears   [x]   Shoulders, Back, and Chest  [x]   Arms, Elbows, and Hands   [x]   Coccyx, Sacrum, and Ischum  [x]   Legs, Feet, and Heels                        Does the Patient have Skin Breakdown?   No         Keron Prevention initiated:  Yes   Wound Care Orders initiated:  NA      [facility] nurse consulted for Pressure Injury (Stage 3,4, Unstageable, DTI, NWPT, and Complex wounds):  NA       Nurse 1 eSignature:  Electronically signed by Ronny Green RN on 10/12/2022 at 5:52 AM   **SHARE this note so that the co-signing nurse is able to place an eSignature**     Nurse 2 eSignature:  Electronically signed by Celina Garcia on 10/12/2022 at 5:53 AM

## 2022-10-12 NOTE — PROGRESS NOTES
Pt re-admitted to floor from PACU. Pt is alert and oriented x 4. Safety precautions in place. Pt's sister is at bedside. IV fluid infusing.      Electronically signed by Den Madrigal RN on 10/12/2022 at 3:31 PM

## 2022-10-12 NOTE — PROGRESS NOTES
Patient is alert & oriented x4, on bedrest until surgery, 2/4 bed rails up, bed in lowest position, fall precautions in place, call light within reach. Pt is NPO for surgery today. ACE wrap and splint remain in place on R ankle. IV fluid infusing.  Electronically signed by Mendoza Ochoa RN on 10/12/2022 at 8:08 AM

## 2022-10-12 NOTE — ANESTHESIA POSTPROCEDURE EVALUATION
Department of Anesthesiology  Postprocedure Note    Patient: Mey Stevens  MRN: [id number]  YOB: 1975  Date of evaluation: 10/12/2022      Procedure Summary     Date: 10/12/22 Room / Location: [address]    Anesthesia Start: 7764 Anesthesia Stop: 6661    Procedure: OPEN REDUCTION INTERNAL FIXATION RIGHT DISTAL TIBIA PILON FRACTURE (Right: Leg Lower) Diagnosis:       Closed fracture of right tibia and fibula, initial encounter      (CLOSED FRACTURE RIGHT TIBIA AND FIBULA)    Surgeons: Henderson Krabbe, MD Responsible Provider: Lainey Shaffer MD    Anesthesia Type: general ASA Status: 2          Anesthesia Type: No value filed.     Roque Phase I: Roque Score: 10    Roque Phase II:        Anesthesia Post Evaluation    Patient location during evaluation: PACU  Patient participation: complete - patient participated  Level of consciousness: awake and alert  Airway patency: patent  Nausea & Vomiting: no nausea and no vomiting  Complications: no  Cardiovascular status: hemodynamically stable  Respiratory status: acceptable  Hydration status: stable

## 2022-10-12 NOTE — PROGRESS NOTES
Pt departed floor for surgery via hospital bed.  Electronically signed by Jessy Hutchison RN on 10/12/2022 at 10:53 AM

## 2022-10-12 NOTE — CARE COORDINATION
Patient in surgery. 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 Also explained we will await therapy recommendations for post hospital needs. Donald Meier reports various family likely able to help as needed.    Electronically signed by Saroj Sanchez on 10/12/2022 at 6:23 PM

## 2022-10-12 NOTE — PROGRESS NOTES
Patient admitted to the floor alert and oriented x4, stable on room air and bedbound. NPO orders to go into effect at midnight. Admission questions completed by charge nurse Anthony. Patient repositioned as best she can. Bed in lowest position. Call light within reach. Patient instructed to utilize call light for needs.

## 2022-10-12 NOTE — PROGRESS NOTES
Pt medicated per orders. Fell asleep while writer was pushing IV meds, resting comfortably at this time, snoring, no longer shaking. Able to obtain clear EKG strip while patient asleep. VSS.

## 2022-10-12 NOTE — PROGRESS NOTES
Pt's sister Gricelda Nelson told me that patient has a history of alcohol abuse/substance abuse. Pt not admitting to this at this time. MD notified of this information via perfect serve.      Electronically signed by Yaz Bang RN on 10/12/2022 at 6:10 PM

## 2022-10-12 NOTE — PROGRESS NOTES
[facility] Orthopedic Surgery   Progress Note      S/P :  SUBJECTIVE  in preop. Alert and oriented. Pain is   described in right lower leg and with the intensity of moderate to severe. Pain is described as aching, throbbing. OBJECTIVE              Physical                      VITALS:  /80   Pulse 91   Temp 97.3 °F (36.3 °C) (Temporal)   Resp 16   Ht 5' 3\" (1.6 m)   Wt 152 lb 8.9 oz (69.2 kg)   LMP 09/21/2022 (Approximate)   SpO2 95%   BMI 27.02 kg/m²                     MUSCULOSKELETAL:  right foot NVI. Wiggles toes to command. Able to plantarflex and dorsiflex ankle Pedal pulses are palpable. NEUROLOGIC:                                  Sensory:  Touch:  Right Lower Extremity:  normal                                        Right leg in posterior splint with ACE.  Foot to thigh    Data       CBC:   Lab Results   Component Value Date/Time    WBC 8.2 10/11/2022 05:06 PM    RBC 3.13 10/11/2022 05:06 PM    HGB 12.0 10/11/2022 05:06 PM    HCT 34.4 10/11/2022 05:06 PM    .1 10/11/2022 05:06 PM    MCH 38.3 10/11/2022 05:06 PM    MCHC 34.7 10/11/2022 05:06 PM    RDW 13.4 10/11/2022 05:06 PM     10/11/2022 05:06 PM    MPV 7.4 10/11/2022 05:06 PM        WBC:    Lab Results   Component Value Date/Time    WBC 8.2 10/11/2022 05:06 PM        Hemoglobin/Hematocrit:    Lab Results   Component Value Date/Time    HGB 12.0 10/11/2022 05:06 PM    HCT 34.4 10/11/2022 05:06 PM        PT/INR:  No results found for: PROTIME, INR           Current Inpatient Medications             Current Facility-Administered Medications: morphine (PF) injection 2 mg, 2 mg, IntraVENous, Q3H PRN  ondansetron (ZOFRAN) injection 4 mg, 4 mg, IntraVENous, Q6H PRN  0.9 % sodium chloride infusion, , IntraVENous, Continuous  oxyCODONE (ROXICODONE) immediate release tablet 5 mg, 5 mg, Oral, Q4H PRN **OR** oxyCODONE HCl (OXY-IR) immediate release tablet 10 mg, 10 mg, Oral, Q4H PRN  0.9 % sodium chloride infusion, , IntraVENous, Continuous    ASSESSMENT AND PLAN    Fall yesterday  Right lower leg and ankle pain  Right tib/fib shaft fx  Right medial malleolar fx, pilon fracture per CT  NPO for surgery today per Dr Heriberto Arias, APRN - CNP  10/12/2022  11:38 AM

## 2022-10-12 NOTE — PROGRESS NOTES
Pt to pacu from or. Pt awake but groggy, shaking uncontrollably, states from cold and pain. Pt adjusted in bed, given dose of dilaudid per CRNA, [facility] at arrival. Pt states pain 10/10 at this time, unable to obtain proper vitals or EKG strip due to patient shaking and restlessness in bed. Report obtained. Foot elevated on pillow, ice pack applied.

## 2022-10-12 NOTE — PROGRESS NOTES
Has been asleep for the last 30 minutes,states pain down to 6/10, and is ready to go back upstairs to her room. Pt no longer shaking, appears more comfortable at this time. pt updated on plan of care.

## 2022-10-12 NOTE — ANESTHESIA PRE PROCEDURE
Department of Anesthesiology  Preprocedure Note       Name:  Alycia Dalton   Age:  52 y.o.  :  1975                                          MRN:  [id number]         Date:  10/12/2022      Surgeon: Aylin Coreas):  Colleen Cabello MD    Procedure: Procedure(s):  OPEN REDUCTION INTERNAL FIXATION RIGHT DISTAL TIBIAL PILON FRACTURE    Medications prior to admission:   Prior to Admission medications    Medication Sig Start Date End Date Taking? Authorizing Provider   omeprazole (PRILOSEC) 20 MG delayed release capsule Take 20 mg by mouth daily   Yes Historical Provider, MD       Current medications:    Current Facility-Administered Medications   Medication Dose Route Frequency Provider Last Rate Last Admin    morphine (PF) injection 2 mg  2 mg IntraVENous Q3H PRN Caralee Sameer, APRN - CNP   2 mg at 10/12/22 1050    ondansetron (ZOFRAN) injection 4 mg  4 mg IntraVENous Q6H PRN Caralee Sameer, APRN - CNP   4 mg at 10/11/22 1736    0.9 % sodium chloride infusion   IntraVENous Continuous Monique Clam, APRN -  mL/hr at 10/12/22 0438 New Bag at 10/12/22 0438    oxyCODONE (ROXICODONE) immediate release tablet 5 mg  5 mg Oral Q4H PRN Caralee [place], APRN - CNP        Or    oxyCODONE HCl (OXY-IR) immediate release tablet 10 mg  10 mg Oral Q4H PRN Caralee [place], APRN - CNP   10 mg at 10/12/22 0939    0.9 % sodium chloride infusion   IntraVENous Continuous Caralee [place], APRN - CNP           Allergies: Allergies   Allergen Reactions    Pcn [Penicillins]        Problem List:    Patient Active Problem List   Diagnosis Code    Closed fracture of right fibula and tibia S82.201A, S82.401A    Closed displaced spiral fracture of shaft of right tibia with delayed healing S82.241G    Closed displaced pilon fracture of right tibia O83.687U    Current smoker F17.200       Past Medical History:  History reviewed. No pertinent past medical history.     Past Surgical History:  History reviewed. No pertinent surgical history. Social History:    Social History     Tobacco Use    Smoking status: Never    Smokeless tobacco: Never   Substance Use Topics    Alcohol use: Not on file                                Counseling given: No      Vital Signs (Current):   Vitals:    10/11/22 1915 10/11/22 1941 10/12/22 0437 10/12/22 0802   BP: (!) 122/95 120/81  103/66   Pulse:  88  93   Resp:  16 16 18   Temp:  98.2 °F (36.8 °C)  98.2 °F (36.8 °C)   TempSrc:  Oral  Oral   SpO2: 93% 97%  97%   Weight:       Height:                                                  BP Readings from Last 3 Encounters:   10/12/22 103/66       NPO Status: Time of last liquid consumption: 0000                        Time of last solid consumption: 0000                        Date of last liquid consumption: 10/12/22                        Date of last solid food consumption: 10/12/22    BMI:   Wt Readings from Last 3 Encounters:   10/11/22 152 lb 8.9 oz (69.2 kg)     Body mass index is 27.02 kg/m². CBC:   Lab Results   Component Value Date/Time    WBC 8.2 10/11/2022 05:06 PM    RBC 3.13 10/11/2022 05:06 PM    HGB 12.0 10/11/2022 05:06 PM    HCT 34.4 10/11/2022 05:06 PM    .1 10/11/2022 05:06 PM    RDW 13.4 10/11/2022 05:06 PM     10/11/2022 05:06 PM       CMP:   Lab Results   Component Value Date/Time     10/11/2022 05:06 PM    K 3.3 10/11/2022 05:06 PM     10/11/2022 05:06 PM    CO2 24 10/11/2022 05:06 PM    BUN 5 10/11/2022 05:06 PM    CREATININE <0.5 10/11/2022 05:06 PM    GFRAA >60 10/11/2022 05:06 PM    GFRAA >60 02/21/2010 10:55 AM    LABGLOM >60 10/11/2022 05:06 PM    GLUCOSE 104 10/11/2022 05:06 PM    CALCIUM 8.8 10/11/2022 05:06 PM       POC Tests: No results for input(s): POCGLU, POCNA, POCK, POCCL, POCBUN, POCHEMO, POCHCT in the last 72 hours.     Coags: No results found for: PROTIME, INR, APTT    HCG (If Applicable): No results found for: PREGTESTUR, PREGSERUM, HCG, HCGQUANT     ABGs: No results found for: PHART, PO2ART, BMT7HLA, PEH9MSY, BEART, O6IRXCND     Type & Screen (If Applicable):  No results found for: LABABO, LABRH    Drug/Infectious Status (If Applicable):  No results found for: HIV, HEPCAB    COVID-19 Screening (If Applicable): No results found for: COVID19        Anesthesia Evaluation  Patient summary reviewed no history of anesthetic complications:   Airway: Mallampati: II  TM distance: >3 FB   Neck ROM: full  Mouth opening: > = 3 FB   Dental:      Comment: Missing teeth    Pulmonary: breath sounds clear to auscultation  (+) current smoker    (-) COPD, asthma, shortness of breath, recent URI and sleep apnea          Patient did not smoke on day of surgery. Cardiovascular:        (-) hypertension, valvular problems/murmurs, past MI, CAD, CABG/stent, dysrhythmias,  angina,  CHF, orthopnea and no pulmonary hypertension      Rhythm: regular  Rate: normal                    Neuro/Psych:      (-) seizures, neuromuscular disease, TIA, CVA, headaches and psychiatric history           GI/Hepatic/Renal:   (+) GERD (denies sx, takes OTC med): well controlled,      (-) PUD, hepatitis, liver disease, no renal disease and bowel prep       Endo/Other:        (-) diabetes mellitus, hypothyroidism, hyperthyroidism               Abdominal:             Vascular: Other Findings:           Anesthesia Plan      general     ASA 2       Induction: intravenous. MIPS: Postoperative opioids intended and Prophylactic antiemetics administered. Anesthetic plan and risks discussed with patient. Plan discussed with CRNA. This pre-anesthesia assessment may be used as a history and physical.    DOS STAFF ADDENDUM:    Pt seen and examined, chart reviewed (including anesthesia, drug and allergy history). No interval changes to history and physical examination.   Anesthetic plan, risks, benefits, alternatives, and personnel involved discussed with patient. Patient verbalized an understanding and agrees to proceed.       Ayo Alcaraz MD  October 12, 2022  11:30 AM        Ayo Alcaraz MD   10/12/2022

## 2022-10-12 NOTE — H&P
[facility] Orthopedic Surgery  H&P Note              CHIEF COMPLAINT:  Right ankle pain / distal tibia pilon fracture with lateral malleolus fracture. History Obtained From:  patient, electronic medical record    HISTORY OF PRESENT ILLNESS:    Ms. Dalton 52 y.o.  female seen today at [facility] for evaluation of a right ankle injury which occurred when she was walking her dog and slipped over a curb twisting her right ankle. She was first seen and evaluated in [facility] ED, where she was x-rayed, splinted and I was consulted. She is complaining of medial & lateral ankle pain and swelling 10/10. This is better with elevation and worse with bearing any wt. The pain is sharp and not radiating. No numbness or tingling sensation. Alleviating factors: elevation and rest. No other complaint. Past Medical History:    No past medical history on file. Past Surgical History:    No past surgical history on file. Medications prior to admission:   Prior to Admission medications    Medication Sig Start Date End Date Taking?  Authorizing Provider   omeprazole (PRILOSEC) 20 MG delayed release capsule Take 20 mg by mouth daily   Yes Historical Provider, MD       Current Medications:   Current Facility-Administered Medications: morphine (PF) injection 2 mg, 2 mg, IntraVENous, Q3H PRN  ondansetron (ZOFRAN) injection 4 mg, 4 mg, IntraVENous, Q6H PRN  0.9 % sodium chloride infusion, , IntraVENous, Continuous  oxyCODONE (ROXICODONE) immediate release tablet 5 mg, 5 mg, Oral, Q4H PRN **OR** oxyCODONE HCl (OXY-IR) immediate release tablet 10 mg, 10 mg, Oral, Q4H PRN  0.9 % sodium chloride infusion, , IntraVENous, Continuous    Allergies:  Pcn [penicillins]    Social History     Socioeconomic History    Marital status: Single     Spouse name: Not on file    Number of children: Not on file    Years of education: Not on file    Highest education level: Not on file   Occupational History    Not on file   Tobacco Use    Smoking status: Never    Smokeless tobacco: Never   Substance and Sexual Activity    Alcohol use: Not on file    Drug use: Not on file    Sexual activity: Not on file   Other Topics Concern    Not on file   Social History Narrative    Not on file     Social Determinants of Health     Financial Resource Strain: Not on file   Food Insecurity: Not on file   Transportation Needs: Not on file   Physical Activity: Not on file   Stress: Not on file   Social Connections: Not on file   Intimate Partner Violence: Not on file   Housing Stability: Not on file       Family History:  History reviewed and no pertinent family history. REVIEW OF SYSTEMS:   CONSTITUTIONAL: Denies unexplained weight loss, fevers, chills or fatigue  NEUROLOGICAL: Denies unsteady gait or progressive weakness    PSYCHOLOGICAL: Denies anxiety, depression   SKIN: Denies skin changes, delayed healing, rash, itching   HEMATOLOGIC: Denies easy bleeding or bruising  ENDOCRINE: Denies excessive thirst, urination, heat/cold  RESPIRATORY: Denies current dyspnea, cough  CARDIOVASCULAR: Negative for chest pain at this time. EYES: Negative for photophobia and visual disturbance. ENT:  Negative for rhinorrhea, epistaxis, sore throat, or hearing loss. GI: Denies nausea, vomiting, diarrhea   : Denies bowel or bladder issues   MUSCULOSKELETAL: Right ankle pain. All other ROS reviewed in chart or with patient or family and are grossly negative. PHYSICAL EXAMINATION:  Ms. Camilla Rivera is a very pleasant 52 y.o. female who seen today in no acute distress, awake, alert, and oriented. She is well nourished and groomed. Patient with normal affect. Body mass index is 27.02 kg/m². . Skin warm and dry. Resting respiratory rate is 16. Resp deep and easy.  Pulse is with regular rate and rhythm    /81   Pulse 88   Temp 98.2 °F (36.8 °C) (Oral)   Resp 16   Ht 5' 3\" (1.6 m)   Wt 152 lb 8.9 oz (69.2 kg)   LMP 09/21/2022 (Approximate)   SpO2 97%   BMI 27.02 kg/m² Airway is intact  Chest: chest clear, no wheezing, rales, normal symmetric air entry, no tachypnea, retractions or cyanosis  Heart: regular rate and rhythm ; heart sounds normal   Hearing intact, pupil equal and reactive bilateral  Lymphatics; No groin or axillary enlarged lymph nodes. Neck; No swelling  Abdomen; soft, non distended. MUSCULOSKELETAL:   Examination of both ankles showing a decreased range of motion of the right ankle compare to the other side. There is moderate swelling that can be seen, as well as ecchymosis. She has intact sensation and good pedal pulses. She has significant tenderness on deep palpation over the medial & lateral malleolus of the right ankle. NEUROLOGIC:   Sensory:    Touch:                     Right Upper Extremity:  normal                   Left Upper Extremity:  normal                  Right Lower Extremity:  normal                  Left Lower Extremity:  normal        DATA:    CBC:   Lab Results   Component Value Date/Time    WBC 8.2 10/11/2022 05:06 PM    RBC 3.13 10/11/2022 05:06 PM    HGB 12.0 10/11/2022 05:06 PM    HCT 34.4 10/11/2022 05:06 PM    .1 10/11/2022 05:06 PM    MCH 38.3 10/11/2022 05:06 PM    MCHC 34.7 10/11/2022 05:06 PM    RDW 13.4 10/11/2022 05:06 PM     10/11/2022 05:06 PM    MPV 7.4 10/11/2022 05:06 PM     WBC:    Lab Results   Component Value Date/Time    WBC 8.2 10/11/2022 05:06 PM     PT/INR:  No results found for: PROTIME, INR  PTT:  No results found for: APTT[APTT    IMAGING: CT scan and Xrays, 3 views of the right ankle dated 10/11/2022 from [facility] [place]note 2[facility] [address],  were reviewed, and showed a moderately displaced distal tibia pilon fracture with lateral malleolus fracture. IMPRESSION: Right ankle pain / distal tibia pilon fracture with lateral malleolus fracture.       PLAN:  I discussed with Janeth Dalton the overall alignment of the fracture and treatment options including both surgical and non-surgical treatment, and that my recommendation is an open reduction and internal fixation given the amount of displacement and comminution of the fracture. I discussed the risks and benefits of surgery with the patient, including but not limited to infection, bleeding, pain, injury to nerves or blood vessels failure of the surgery and need for additional surgery. All the patient's questions were answered. We discussed an expected post-operative course. She  is understanding of this and wishes to proceed. The patient smokes cigarettes, and we discussed with the patient the risks of smoking on general health and also on bone and soft tissue healing (delay and non-union), and promised to cut down or stop smoking. Smoking: Educated the patient regarding the hazards of smoking and that it harms their body in many ways. It increases the chance of developing heart disease, lung disease, cancer, and other health problems including poor bone and wound healing. The importance of smoking cessation for optimal bone and wound healing was stressed. This was communicated verbally, 5 Minutes.       Adolph Ibrahim MD   10/12/2022  7:32 AM

## 2022-10-12 NOTE — PROGRESS NOTES
Patient A&O. No complaints at this time. Pt given PRN pain medication this shift. Cast padding and ACE wrap on R ankle remain clean, dry and intact. Pt instructed that she is kiran NWB to R foot and she verbalizes understanding. Pt able to get up to bathroom this shift with a stedy x 1 assist. Call light within reach, able to make needs known, fall precautions in place. IV fluid infusing per order.  Electronically signed by Jorje Walden RN on 10/12/2022 at 6:49 PM

## 2022-10-13 VITALS
TEMPERATURE: 98.5 F | HEART RATE: 92 BPM | OXYGEN SATURATION: 93 % | DIASTOLIC BLOOD PRESSURE: 70 MMHG | WEIGHT: 151.24 LBS | BODY MASS INDEX: 26.8 KG/M2 | SYSTOLIC BLOOD PRESSURE: 111 MMHG | RESPIRATION RATE: 16 BRPM | HEIGHT: 63 IN

## 2022-10-13 LAB
HCG QUALITATIVE: NEGATIVE
HCT VFR BLD CALC: 32.2 % (ref 36–48)
HEMOGLOBIN: 10.9 G/DL (ref 12–16)

## 2022-10-13 PROCEDURE — 6370000000 HC RX 637 (ALT 250 FOR IP): Performed by: ORTHOPAEDIC SURGERY

## 2022-10-13 PROCEDURE — 97530 THERAPEUTIC ACTIVITIES: CPT

## 2022-10-13 PROCEDURE — 97535 SELF CARE MNGMENT TRAINING: CPT

## 2022-10-13 PROCEDURE — 97161 PT EVAL LOW COMPLEX 20 MIN: CPT

## 2022-10-13 PROCEDURE — 6360000002 HC RX W HCPCS: Performed by: ORTHOPAEDIC SURGERY

## 2022-10-13 PROCEDURE — 36415 COLL VENOUS BLD VENIPUNCTURE: CPT

## 2022-10-13 PROCEDURE — 2500000003 HC RX 250 WO HCPCS: Performed by: NURSE PRACTITIONER

## 2022-10-13 PROCEDURE — 2580000003 HC RX 258: Performed by: ORTHOPAEDIC SURGERY

## 2022-10-13 PROCEDURE — 94760 N-INVAS EAR/PLS OXIMETRY 1: CPT

## 2022-10-13 PROCEDURE — 85014 HEMATOCRIT: CPT

## 2022-10-13 PROCEDURE — 97166 OT EVAL MOD COMPLEX 45 MIN: CPT

## 2022-10-13 PROCEDURE — 85018 HEMOGLOBIN: CPT

## 2022-10-13 PROCEDURE — 97116 GAIT TRAINING THERAPY: CPT

## 2022-10-13 PROCEDURE — 84703 CHORIONIC GONADOTROPIN ASSAY: CPT

## 2022-10-13 RX ORDER — OXYCODONE HYDROCHLORIDE 5 MG/1
5-10 TABLET ORAL EVERY 6 HOURS PRN
Qty: 40 TABLET | Refills: 0 | Status: SHIPPED | OUTPATIENT
Start: 2022-10-13 | End: 2022-10-18

## 2022-10-13 RX ORDER — ASPIRIN 81 MG/1
81 TABLET ORAL 2 TIMES DAILY
Status: DISCONTINUED | OUTPATIENT
Start: 2022-10-13 | End: 2022-10-13 | Stop reason: HOSPADM

## 2022-10-13 RX ORDER — ASPIRIN 81 MG/1
81 TABLET ORAL 2 TIMES DAILY
Qty: 120 TABLET | Refills: 0 | Status: SHIPPED | OUTPATIENT
Start: 2022-10-13 | End: 2022-12-12

## 2022-10-13 RX ADMIN — OXYCODONE HYDROCHLORIDE 10 MG: 10 TABLET ORAL at 01:23

## 2022-10-13 RX ADMIN — ASPIRIN 325 MG: 325 TABLET, DELAYED RELEASE ORAL at 07:55

## 2022-10-13 RX ADMIN — OXYCODONE HYDROCHLORIDE 10 MG: 10 TABLET ORAL at 07:52

## 2022-10-13 RX ADMIN — MORPHINE SULFATE 2 MG: 2 INJECTION, SOLUTION INTRAMUSCULAR; INTRAVENOUS at 04:13

## 2022-10-13 RX ADMIN — CLINDAMYCIN PHOSPHATE 900 MG: 900 INJECTION, SOLUTION INTRAVENOUS at 04:10

## 2022-10-13 RX ADMIN — SODIUM CHLORIDE, PRESERVATIVE FREE 10 ML: 5 INJECTION INTRAVENOUS at 07:56

## 2022-10-13 ASSESSMENT — PAIN SCALES - WONG BAKER: WONGBAKER_NUMERICALRESPONSE: 0

## 2022-10-13 ASSESSMENT — PAIN SCALES - GENERAL
PAINLEVEL_OUTOF10: 8
PAINLEVEL_OUTOF10: 8
PAINLEVEL_OUTOF10: 7
PAINLEVEL_OUTOF10: 0

## 2022-10-13 ASSESSMENT — PAIN DESCRIPTION - LOCATION
LOCATION: LEG
LOCATION: ANKLE;LEG

## 2022-10-13 ASSESSMENT — PAIN - FUNCTIONAL ASSESSMENT: PAIN_FUNCTIONAL_ASSESSMENT: PREVENTS OR INTERFERES SOME ACTIVE ACTIVITIES AND ADLS

## 2022-10-13 ASSESSMENT — PAIN DESCRIPTION - ORIENTATION
ORIENTATION: RIGHT
ORIENTATION: RIGHT

## 2022-10-13 ASSESSMENT — PAIN DESCRIPTION - DESCRIPTORS
DESCRIPTORS: THROBBING
DESCRIPTORS: ACHING;DISCOMFORT

## 2022-10-13 NOTE — PROGRESS NOTES
Physical Therapy  Facility/Department: [address] ORTHOPEDICS  Physical Therapy Initial Assessment    Name: Simone Dalton  : 1975  MRN: [id number]  Date of Service: 10/13/2022    Assessment / Discharge Recommendations:   -right ankle fracture ORIF  -good start mobilizing from bed to hop using walker in room  -anticipate discharge to home -states she will have someone to stay with her  -will need rolling walker for home use   -she will try to find wheelchair, shower chair and bedside commode through family and friends  -she is NOT TO USE Kneeling scooter    Patient Diagnosis(es): The encounter diagnosis was Closed fracture of right tibia and fibula, initial encounter. Past Medical History:  has no past medical history on file. Past Surgical History:  has a past surgical history that includes Leg Surgery (Right, 10/12/2022). Body Structures, Functions, Activity Limitations Requiring Skilled Therapeutic Intervention: Decreased functional mobility ; Decreased ADL status; Increased pain  Therapy Prognosis: Good  Decision Making: Medium Complexity  Requires PT Follow-Up: Yes  Activity Tolerance  Activity Tolerance: Patient tolerated treatment well     Plan   Physcial Therapy Plan  Current Treatment Recommendations: Functional mobility training, Transfer training, Positioning, Modalities, Therapeutic activities, Patient/Caregiver education & training  Additional Comments: 1-2 sessions     Restrictions  Restrictions/Precautions  Restrictions/Precautions: Fall Risk  Position Activity Restriction  Other position/activity restrictions: NWB right foot     Subjective   General  Chart Reviewed: Yes  Patient assessed for rehabilitation services?: Yes  Additional Pertinent Hx: here due to fall with right ankle fracture  Response To Previous Treatment: Not applicable  Family / Caregiver Present: No  Follows Commands: Within Functional Limits  Subjective  Subjective: arrived to room along with OT to patient resting in bed - she is alert oriented and agreeable to PTOT assessments and OOB to demonstrate her mobility - rates pain as moderate         Social/Functional History  Social/Functional History  Lives With: Alone  Type of Home: House  Home Layout: One level  Home Access: Stairs to enter without rails, Level entry  Entrance Stairs - Number of Steps: 1  Bathroom Shower/Tub: Tub/Shower unit  Bathroom Toilet: Standard  Bathroom Equipment: Grab bars in shower, Shower chair  Has the patient had two or more falls in the past year or any fall with injury in the past year?: No  ADL Assistance: Independent  Homemaking Assistance: Independent  Ambulation Assistance: Independent  Transfer Assistance: Independent  Active : Yes  Type of Occupation: maesme therapist  Vision/Hearing  Vision  Vision: Impaired  Vision Exceptions: Wears glasses for reading  Hearing  Hearing: Within functional limits    Cognition   Orientation  Overall Orientation Status: Within Functional Limits  Cognition  Overall Cognitive Status: WFL     Objective   Observation/Palpation  Observation: right distal leg splinted  Gross Assessment  Tone: Normal  Sensation: Intact   Strength Other  Other: grossly wfl non-surgical limbs  Bed mobility  Supine to Sit: Stand by assistance  Sit to Supine: Stand by assistance  Transfers  Sit to Stand: Stand by assistance;Contact guard assistance  Stand to Sit: Stand by assistance  Ambulation  Surface: Level tile  Device: Rolling Walker  Assistance: Stand by assistance;Contact guard assistance  Quality of Gait: hop to pattern with good stability and maintains NWB right LE  Distance: bed to recliner for ~5 feet  Comments: steady  More Ambulation?: No  Stairs/Curb  Stairs?: No  Balance  Comments: midline in sitting at the EOB and in static stance on the walker while NWB right foot   -dynamic is good on rolling walker while NWB right foot for short distance    AM-PAC Score  AM-PAC Inpatient Mobility Raw Score : 18 (10/13/22 0555)  AM-PAC Inpatient T-Scale Score : 43.63 (10/13/22 0920)  Mobility Inpatient CMS 0-100% Score: 46.58 (10/13/22 0920)  Mobility Inpatient CMS G-Code Modifier : CK (10/13/22 0920)    Goals  Short Term Goals  Time Frame for Short Term Goals: 1-2 days  Short Term Goal 1: bed mobility at sba  Short Term Goal 2: transfers at sba up to walker  Short Term Goal 3: ambulation at sba/light cga nwb right foot using rolling walker for room distances      (will have assist up one step to enter or go around to rear door with level entry)  Patient Goals   Patient Goals : be safe to go home       Education  Patient Education  Education Given To: Patient  Education Provided: Role of Therapy;Plan of Care;Precautions;Transfer Training  Education Method: Demonstration;Verbal  Barriers to Learning: None  Education Outcome: Verbalized understanding;Demonstrated understanding      Therapy Time   Individual Concurrent Group Co-treatment   Time In 0840         Time Out 0920         Minutes [address] Levon Angulo, PT

## 2022-10-13 NOTE — PROGRESS NOTES
Occupational Therapy  Facility/Department: [facility]  Occupational Therapy Initial Assessment    Name: Linda Dalton  : 1975  MRN: [id number]  Date of Service: 10/13/2022    Discharge Recommendations:  24 hour supervision or assist        Sraah Dalton scored a  on the [facility] ADL Inpatient form. Please see assessment section for further patient specific details. If patient discharges prior to next session this note will serve as a discharge summary. Please see below for the latest assessment towards goals. Patient Diagnosis(es): The encounter diagnosis was Closed fracture of right tibia and fibula, initial encounter. Past Medical History:  has no past medical history on file. Past Surgical History:  has a past surgical history that includes Leg Surgery (Right, 10/12/2022). Treatment Diagnosis: impaired ADL/fxl mobility    Assessment   Performance deficits / Impairments: Decreased functional mobility ; Decreased endurance;Decreased ADL status; Decreased high-level IADLs;Decreased balance  Assessment: 44 yo female admitted after a fall on curb resulting in Right tib/fib shaft, medial malleolar, and pilon fx. s/p 10/1222 R distal tibia pilon ORIF now NWB at least 8 weeks. PMH: unremarkable. PTA, pt lives alone and fully IND no AD. Pt educated on RLE NWB, bathing/dressing seated (showed pictures of TTB), benefits of BSC at night, LB dressing technique, and benefits of ice/elevation. Pt completes bed mobility SBA, LB dressing Min A, fxl tx and short mobility with RW with RLE NWB SBA/CGA. BUE strong for RLE NWB. Cont acute OT to address above deficits.  Anticipate safe return home wt  SUP/assist  Treatment Diagnosis: impaired ADL/fxl mobility  Prognosis: Good  Decision Making: Medium Complexity  REQUIRES OT FOLLOW-UP: Yes  Activity Tolerance  Activity Tolerance: Patient Tolerated treatment well        Plan   Occupational Therapy Plan  Times Per Week: 1-2 sessions  Current Treatment Recommendations: Strengthening, Balance training, ROM, Functional mobility training, Endurance training, Pain management, Safety education & training, Self-Care / ADL, Home management training, Modalities, Positioning, Patient/Caregiver education & training     Restrictions  Restrictions/Precautions  Restrictions/Precautions: Weight Bearing, Fall Risk  Lower Extremity Weight Bearing Restrictions  Right Lower Extremity Weight Bearing: Non Weight Bearing  Position Activity Restriction  Other position/activity restrictions: NWB right foot    Subjective   General  Chart Reviewed: Yes  Patient assessed for rehabilitation services?: Yes  Additional Pertinent Hx: 42 yo female admitted after a fall on curb resulting in Right tib/fib shaft, medial malleolar, and pilon fx. s/p 10/1222 R distal tibia pilon ORIF now NWB at least 8 weeks.  PMH: unremarkable  Family / Caregiver Present: No  Referring Practitioner: Lennox Conn MD  Diagnosis: Ankle fx  Subjective  Subjective: Pt resting in bed upon arrival and agreeable to OT/PT eval. Pt reporting minimal R ankle pain at rest, moderate with activity  General Comment  Comments: RN ok to see     Social/Functional History  Social/Functional History  Lives With: Alone  Type of Home: House  Home Layout: One level  Home Access: Stairs to enter without rails, Level entry  Entrance Stairs - Number of Steps: 1  Bathroom Shower/Tub: Tub/Shower unit  Bathroom Toilet: Standard  Bathroom Equipment: Grab bars in shower, Shower chair  Has the patient had two or more falls in the past year or any fall with injury in the past year?: No  ADL Assistance: Independent  Homemaking Assistance: Independent  Ambulation Assistance: Independent  Transfer Assistance: Independent  Active : Yes  Type of Occupation: massage therapist       Objective           Observation/Palpation  Observation: RLE splint and ace wrap metatarsals to proximal calf  Safety Devices  Type of Devices: Call light within reach;Chair alarm in place;Gait belt;Patient at risk for falls;Nurse notified; Left in chair        AROM: Within functional limits  PROM: Within functional limits  Strength: Within functional limits  Coordination: Within functional limits  Tone: Normal  ADL  LE Dressing: Minimal assistance  LE Dressing Skilled Clinical Factors: donning pants, assist with threading over R LE. pt able to manage over hips     Activity Tolerance  Activity Tolerance: Patient tolerated treatment well  Bed mobility  Supine to Sit: Stand by assistance  Scooting: Stand by assistance  Transfers  Sit to stand: Stand by assistance;Contact guard assistance  Stand to sit: Stand by assistance;Contact guard assistance  Transfer Comments: RW. initial cues for hand placement. to/from EOB and recliner x2.  EOB>recliner ~5 ft with RW with SBA/CGA- initial cues to slow pace  Vision  Vision: Impaired  Vision Exceptions: Wears glasses for reading  Hearing  Hearing: Within functional limits  Cognition  Overall Cognitive Status: WFL  Orientation  Overall Orientation Status: Within Functional Limits                  Education Given To: Patient  Education Provided: Role of Therapy;Plan of Care;Transfer Training;ADL Adaptive Strategies;Precautions  Education Provided Comments: Pt educated on RLE NWB, bathing/dressing seated (showed pictures of TTB), benefits of BSC at night, LB dressing technique, and benefits of ice/elevation  Education Method: Demonstration;Verbal  Barriers to Learning: None  Education Outcome: Verbalized understanding;Demonstrated understanding;Continued education needed                      AM-PAC Score        AM-[facility] Inpatient Daily Activity Raw Score: 21 (10/13/22 1014)  AM-PAC Inpatient ADL T-Scale Score : 44.27 (10/13/22 1014)  ADL Inpatient CMS 0-100% Score: 32.79 (10/13/22 1014)  ADL Inpatient CMS G-Code Modifier : Pierce Connors (10/13/22 1014)    Goals  Short Term Goals  Time Frame for Short Term Goals: prior to d/c  Short Term Goal 1: toileting CGA  Short Term Goal 2: LB dressing CGA  Short Term Goal 3: UB bathing/dressing set up  Short Term Goal 4: fxl tx and mobility with RW household distances CGA  Patient Goals   Patient goals : return home with family       Therapy Time   Individual Concurrent Group Co-treatment   Time In 0840         Time Out 0920         Minutes 40         Timed Code Treatment Minutes: 25 Minutes (15 eval. 10 ADL 15 TA)       BRAD Stacy, OTR/L

## 2022-10-13 NOTE — PLAN OF CARE
Problem: Discharge Planning  Goal: Discharge to home or other facility with appropriate resources  10/12/2022 2252 by Johnson Heimlich, RN  Outcome: Progressing  Flowsheets (Taken 10/12/2022 2016)  Discharge to home or other facility with appropriate resources:   Identify barriers to discharge with patient and caregiver   Arrange for needed discharge resources and transportation as appropriate   Identify discharge learning needs (meds, wound care, etc)  10/12/2022 1111 by Jorje Walden RN  Outcome: Progressing  Flowsheets  Taken 10/12/2022 1111  Discharge to home or other facility with appropriate resources:   Identify barriers to discharge with patient and caregiver   Identify discharge learning needs (meds, wound care, etc)  Taken 10/12/2022 0801  Discharge to home or other facility with appropriate resources: Identify barriers to discharge with patient and caregiver     Problem: Pain  Goal: Verbalizes/displays adequate comfort level or baseline comfort level  10/12/2022 2252 by Johnson Heimlich, RN  Outcome: Progressing  10/12/2022 1111 by Jorje Walden RN  Outcome: Progressing  Flowsheets (Taken 10/12/2022 1111)  Verbalizes/displays adequate comfort level or baseline comfort level:   Encourage patient to monitor pain and request assistance   Administer analgesics based on type and severity of pain and evaluate response     Problem: Safety - Adult  Goal: Free from fall injury  10/12/2022 2252 by Johnson Heimlich, RN  Outcome: Progressing  10/12/2022 1111 by Jorje Walden RN  Outcome: Progressing  Flowsheets (Taken 10/12/2022 1111)  Free From Fall Injury: Instruct family/caregiver on patient safety     Problem: ABCDS Injury Assessment  Goal: Absence of physical injury  10/12/2022 2252 by Johnson Heimlich, RN  Outcome: Progressing  10/12/2022 1111 by Jorje Walden RN  Outcome: Progressing  Flowsheets (Taken 10/12/2022 1111)  Absence of Physical Injury: Implement safety measures based on patient assessment

## 2022-10-13 NOTE — PROGRESS NOTES
[address] [facility] Orthopedic Surgery   Progress Note      S/P :  SUBJECTIVE  in bed. Alert and oriented,. Pain \"better than before surgery but still throbs when medication wears off\". Pain is   described in right lower leg and ankle and with the intensity of moderate. Pain is described as aching, throbbing. OBJECTIVE              Physical                      VITALS:  /70   Pulse 92   Temp 98.5 °F (36.9 °C) (Oral)   Resp 16   Ht 5' 3\" (1.6 m)   Wt 151 lb 3.8 oz (68.6 kg)   LMP 09/21/2022 (Approximate)   SpO2 93%   BMI 26.79 kg/m²                     MUSCULOSKELETAL:  right foot NVI. Wiggles toes to command. Able to plantarflex and dorsiflex ankle Pedal pulses are palpable. NEUROLOGIC:                                  Sensory:  Touch:  Right Lower Extremity:  normal                                                 Surgical wound appears clean and dry right ankle and lower leg with Posterior splint with ACE. Foot elevated.     Data       CBC:   Lab Results   Component Value Date/Time    WBC 8.2 10/11/2022 05:06 PM    RBC 3.13 10/11/2022 05:06 PM    HGB 10.9 10/13/2022 05:08 AM    HCT 32.2 10/13/2022 05:08 AM    .1 10/11/2022 05:06 PM    MCH 38.3 10/11/2022 05:06 PM    MCHC 34.7 10/11/2022 05:06 PM    RDW 13.4 10/11/2022 05:06 PM     10/11/2022 05:06 PM    MPV 7.4 10/11/2022 05:06 PM        WBC:    Lab Results   Component Value Date/Time    WBC 8.2 10/11/2022 05:06 PM        Hemoglobin/Hematocrit:    Lab Results   Component Value Date/Time    HGB 10.9 10/13/2022 05:08 AM    HCT 32.2 10/13/2022 05:08 AM        PT/INR:  No results found for: PROTIME, INR           Current Inpatient Medications             Current Facility-Administered Medications: 0.45 % sodium chloride infusion, , IntraVENous, Continuous  sodium chloride flush 0.9 % injection 5-40 mL, 5-40 mL, IntraVENous, 2 times per day  sodium chloride flush 0.9 % injection 5-40 mL, 5-40 mL, IntraVENous, PRN  0.9 % sodium chloride infusion, , IntraVENous, PRN  morphine (PF) injection 2 mg, 2 mg, IntraVENous, Q3H PRN  aspirin EC tablet 325 mg, 325 mg, Oral, BID  morphine (PF) injection 2 mg, 2 mg, IntraVENous, Q3H PRN  ondansetron (ZOFRAN) injection 4 mg, 4 mg, IntraVENous, Q6H PRN  oxyCODONE (ROXICODONE) immediate release tablet 5 mg, 5 mg, Oral, Q4H PRN **OR** oxyCODONE HCl (OXY-IR) immediate release tablet 10 mg, 10 mg, Oral, Q4H PRN    ASSESSMENT AND PLAN    Fall yesterday  Right lower leg and ankle pain  Right tib/fib shaft fx  Right medial malleolar fx, pilon fracture per CT  Post ORIF right tibial shaft and medial malleolar fx per Dr Lynwood Primrose yesterday, Stable exam  DVT prophylaxis ordered, ASA 325mg twice at day for 30 days for DVT prophylaxis   PT OT for ADL's and ambulation as tolerated, NWB right leg. SS for DC planning, home with home care. Pt plans to have someone move in to help her initially then move in with parents. IV or PO pain med as ordered   Per sisters report pt with hx ETOH and drug abuse, will watch for issues.      Guilherme Salgado, NEISHA - CNP  10/13/2022  9:00 AM

## 2022-10-13 NOTE — PROGRESS NOTES
Pt discharged to home. Transportation here with wheelchair. Accompanied by spouse. Transported in personal vehicle. Discharge instructions, Rx, and personal belongings given to pt. Explanation of discharge medications and instructions understood by verbal statement. No questions, comments or concerns at this time.    Electronically signed by Trisha Bailey RN on 10/13/2022 at 1:18 PM

## 2022-10-13 NOTE — OP NOTE
[address] [place] [address]                                OPERATIVE REPORT    PATIENT NAME: PORTIA ARTEAGA                     :        1975  MED REC NO:   [id number]                          ROOM:       3106  ACCOUNT NO:   [de-identified]                           ADMIT DATE: 10/11/2022  PROVIDER:     Devon Pereira MD    DATE OF PROCEDURE:  10/12/2022    PREOPERATIVE DIAGNOSIS:    1- Right distal tibia pilon intraarticular fracture. 2- Right tibial shaft comminuted fracture. POSTOPERATIVE DIAGNOSIS:    1- Right distal tibia pilon intraarticular fracture. 2- Right tibial shaft comminuted fracture. OPERATION PERFORMED:    1- Open treatment of right distal tibia pilon  fracture with open reduction and internal fixation of the tibia only. 2- Open treatment right tibial shaft comminuted fracture with plate and screws. SURGEON:  Devon Pereira MD    ASSISTANT:  Adilene Meza CNP    ANESTHESIA:  General anesthesia. ESTIMATED BLOOD LOSS:  Minimal.    COMPLICATIONS:  None. TOURNIQUET:  Right upper thigh, 350 mmHg. IMPLANT USED:  Arthrex eight-hole medial distal tibia locking plate and  one lag screw. INDICATIONS:  This is a 59-year-old white female who was walking her dog  and slipped over a curb edge twisting her right ankle. She was brought  to [facility] ER where she was found to have a displaced right distal  tibia pilon fracture. The patient was admitted to the hospital because  she was not able to bear weight. All risks, benefits, and alternatives  were discussed with the patient and she agreed to proceed with the  surgical fixation. OPERATIVE PROCEDURE:  The patient's right ankle was marked. The patient  received 900 mg clindamycin IV preoperatively. The patient was then  brought to the operating room, underwent general anesthesia. A  well-padded tourniquet was placed in right upper thigh.   The right lower  extremity was then prepped and draped in regular sterile routine  fashion. A time-out was called confirming the patient's name, site, and  procedure. Esmarch was used for exsanguination and tourniquet was inflated to 350  mmHg. A medial incision was made over the fracture site. Careful  dissection was performed. We explored the fracture and found it to be  markedly displaced comminuted fracture of the tibia shaft and a separate fracture of distal tibia pilon. It was significantly  challenging, physically and mentally difficult because of instability of  the fracture. We were carefully able to reduce it anatomically and  using multiple bone clamps, we were able to keep it reduced in anatomic  position. At this point, we placed two lag screws to keep it  provisionally in place. We then made another stab incision distally  from which we placed a plate down the medial aspect of the tibia. We  then placed a K-wire distally and confirmed with the C-arm that the  plate was in good position. At this point, we went ahead and put one  screw in the oblong hole and then two nonlocking screws distally that  added into buttressing the fracture anatomically in place and reducing the pilon fracture. While  maintaining the reduction, we made another stab incision proximally  through which we placed three nonlocking screws. Distally, we added  five more 2.7 locking screws. Overall, we were very satisfied with the  anatomic reduction and position of all the screws. At this point, we  let the tourniquet down and hemostasis was secured. We irrigated the  incision copiously with normal saline mixed with gentamicin. We closed  the deep layer with a 2-0 Vicryl, subcu with a 3-0 Vicryl, and the skin  with a 4-0 Monocryl. Steri-Strips were then applied. Dressings were  applied with Xeroform, 4x4, sterile Webril, and a splint was applied.     The patient tolerated the procedure well and was taken to Recovery in  stable condition. Emma Lemus CNP was 1st Assist given the nature of the procedure that needed advanced assistance. She assisted in all aspect of the procedure, including but limited to draping in a sterile fashion, exposure of surgical area, controlling bleeding, retracting and protecting important structures, achieve and maintain bone reduction and surgical wound closure with dressing application. POSTOPERATIVE PLAN:  The patient will be readmitted as an inpatient. We  will start PT/OT with nonweightbearing for at least eight weeks. She  will go home once passed the physical therapy. We will start range of  motion in two weeks.         Shaggy Holbrook MD    D: 10/12/2022 14:51:35       T: 10/12/2022 17:28:31     /[email or web address]  Job#: [id number]     Doc#: [id number]    CC:

## 2022-10-13 NOTE — DISCHARGE INSTR - COC
[facility]) Continuity of Care Form    Patient Name:  Chiquis Dalton  : 1975    MRN:  [id number]    Admit date:  10/11/2022  Discharge date:  ***    Code Status Order: Full Code  Advance Directives: {YES OR NO:}    Admitting Physician: Henderson Krabbe, MD  PCP: No primary care provider on file. Discharging Nurse: [facility] Unit/Room#: U9U-4140/3106-01  Discharging Unit Phone Number: ***    Emergency Contact:        Past Surgical History:  Past Surgical History:   Procedure Laterality Date    LEG SURGERY Right 10/12/2022    OPEN REDUCTION INTERNAL FIXATION RIGHT DISTAL TIBIA PILON FRACTURE performed by Henderson Krabbe, MD at [place] [address]       Immunization History: There is no immunization history on file for this patient. Active Problems:  Principal Problem:    Closed displaced comminuted fracture of shaft of right tibia  Active Problems:    Closed fracture of right fibula and tibia    Closed displaced pilon fracture of right tibia    Current smoker  Resolved Problems:    * No resolved hospital problems.  *      Isolation/Infection:       Nurse Assessment:  Last Vital Signs:/70   Pulse 92   Temp 98.5 °F (36.9 °C) (Oral)   Resp 16   Ht 5' 3\" (1.6 m)   Wt 151 lb 3.8 oz (68.6 kg)   LMP 2022 (Approximate)   SpO2 93%   BMI 26.79 kg/m²   Last documented pain score (0-10 scale): Pain Level: 7  Last Weight:   Wt Readings from Last 1 Encounters:   10/13/22 151 lb 3.8 oz (68.6 kg)     Mental Status:  {IP PT MENTAL STATUS:}     IV Access:  { BOB IV ACCESS:582581144}    Nursing Mobility/ADLs:  Walking   {[facility] DME DEIW:114692826}  Transfer  {[facility] DME HCPO:872946535}  Bathing  {[facility] DME QKJD:528025268}  Dressing  {[facility] DME SSKD:137217570}  Toileting  {[facility] DME TMSQ:940093701}  Feeding  {[facility] DME DHHT:512660903}  Med Admin  {[facility] DME MJDC:479105476}  Med Delivery   { BOB MED Delivery:158409804}    Wound Care Documentation and Therapy:  Keep right lower leg splint and ACE clean and dry. DO NOT remove. Elimination:  Urinary Catheter: {Urinary Catheter:649897193}   Colostomy/Ileostomy: {YES / LAKISHA:18236}  Continence: Bowel: {YES / XR:56781}  Bladder: {YES / YT:92353}  Date of Last BM: ***    Intake/Output Summary (Last 24 hours)     Intake/Output Summary (Last 24 hours) at 10/13/2022 0907  Last data filed at 10/13/2022 0551  Gross per 24 hour   Intake 2314.51 ml   Output 900 ml   Net 1414.51 ml     Safety Concerns:     [address] BOB Safety Concerns:505943810}    Impairments/Disabilities:      [address] BOB Impairments/Disabilities:976282744}    Nutrition Therapy:  Current Nutrition Therapy: ADULT DIET; Regular  Routes of Feeding: {[facility] DME Other Feedings:173090536}  Liquids: {Slp liquid thickness:31812}  Daily Fluid Restriction: {CHP DME Yes amt example:833787939}  Last Modified Barium Swallow with Video (Video Swallowing Test): {Done Not Done AJAU:061260352}    Treatments at the Time of Hospital Discharge:   Respiratory Treatments: ***  Oxygen Therapy:  {Therapy; copd oxygen:93227}  Ventilator:    { CC Vent CVKL:496733628}    Lab orders for discharge:        Rehab Therapies: Physical Therapy, Occupational Therapy and nursing care  Weight Bearing Status/Restrictions: Non-weight bearing on right leg  Other Medical Equipment (for information only, NOT a DME order): walker   Other Treatments: ASA 81mg twice at day for 30 days for DVT prophylaxis     Patient's personal belongings (please select all that are sent with patient):  {[facility] DME Belongings:313007016}    RN SIGNATURE:  {Esignature:194884310}    PHYSICIAN SECTION    Prognosis: Good    Condition at Discharge: Stable    Rehab Potential (if transferring to Rehab): Good    Physician Certification: I certify the above orders, information, and transfer of Janeth Dalton is necessary for the continuing treatment of the diagnosis listed and that he requires Home Care for less 30 days.      Update Admission H&P: No change in H&P    PHYSICIAN SIGNATURE:

## 2022-10-14 NOTE — DISCHARGE SUMMARY
Physician Discharge Summary     Patient ID:  Tracie Benson  [id number]  52 y.o.  1975    Admit date: 10/11/2022    Discharge date and time: 10/13/2022 12:52 PM     Admitting Physician: Laura Stiles MD     Discharge Physician: Dr Laurel Norris    Admission Diagnoses: Closed displaced spiral fracture of shaft of right tibia with delayed healing [S82.241G]  Closed fracture of right tibia and fibula, initial encounter [S82.201A, S82.401A]    Discharge Diagnoses:displace fracture right tibia. Right medial malleolar fracture     Admission Condition: good    Discharged Condition: good    Indication for Admission: Pt sustained a fall prior to admission. She was uanble to walk on right leg due to pain and came to ER. Noted with tibial fractures and was admitted for pain control and stabilization of the fracture. Surgical procedure: 1- Open treatment of right distal tibia pilon  fracture with open reduction and internal fixation of the tibia only. 2- Open treatment right tibial shaft comminuted fracture with plate and screws. Consults: PT OT SS    This patient had no postoperative complications. They has PT and OT for ADL's . IV and PO pain med for pain control and was eventually DC in stable condition    Treatments: analgesia,  therapies: PT OT,  and surgery      Disposition: home    Patient Instructions:   [unfilled]  Activity: activity as tolerated  Diet: regular diet  Wound Care: keep wound clean and dry    Follow-up with Dr Laurel Norris in 2 weeks.     Signed:  NEISHA Rodas CNP  10/14/2022  12:02 PM

## 2022-10-26 ENCOUNTER — OFFICE VISIT (OUTPATIENT)
Dept: ORTHOPEDIC SURGERY | Age: 47
End: 2022-10-26

## 2022-10-26 VITALS — BODY MASS INDEX: 26.75 KG/M2 | WEIGHT: 151 LBS | HEIGHT: 63 IN

## 2022-10-26 DIAGNOSIS — S82.201A CLOSED FRACTURE OF RIGHT TIBIA AND FIBULA, INITIAL ENCOUNTER: ICD-10-CM

## 2022-10-26 DIAGNOSIS — S82.251D CLOSED DISPLACED COMMINUTED FRACTURE OF SHAFT OF RIGHT TIBIA WITH ROUTINE HEALING, SUBSEQUENT ENCOUNTER: Primary | ICD-10-CM

## 2022-10-26 DIAGNOSIS — S82.401A CLOSED FRACTURE OF RIGHT TIBIA AND FIBULA, INITIAL ENCOUNTER: ICD-10-CM

## 2022-10-26 PROCEDURE — L4361 PNEUMA/VAC WALK BOOT PRE OTS: HCPCS | Performed by: NURSE PRACTITIONER

## 2022-10-26 PROCEDURE — APPNB30 APP NON BILLABLE TIME 0-30 MINS: Performed by: NURSE PRACTITIONER

## 2022-10-26 PROCEDURE — 99024 POSTOP FOLLOW-UP VISIT: CPT | Performed by: NURSE PRACTITIONER

## 2022-10-27 RX ORDER — TRAMADOL HYDROCHLORIDE 50 MG/1
50 TABLET ORAL EVERY 6 HOURS PRN
Qty: 20 TABLET | Refills: 0 | Status: SHIPPED | OUTPATIENT
Start: 2022-10-27 | End: 2022-11-01

## 2022-10-27 NOTE — PROGRESS NOTES
DIAGNOSIS:    1-Right ankle distal tibia pilon intra-articular fracture, status post ORIF. 2-Right tibial shaft comminuted fracture, status post ORIF  3-Right lateral malleolus fracture, nonoperative    DATE OF SURGERY: 10/12/2022. HISTORY OF PRESENT ILLNESS:  Ms. Dalton 52 y.o.  female who came in today for 2 weeks postoperative visit. The patient denies any significant pain in the right ankle. Rates pain a 3/10 VAS mild, aching, throbbing, intermittent and are improving. Aggravating factors movement. Alleviating factors elevation and rest. She has been in a splint, and non WB. No numbness or tingling sensation. No fever or Chills. 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 She is asking for more pain medication. Denies smoking. PHYSICAL EXAMINATION:  The incision healing well. No signs of any erythema or drainage, minimal swelling. She has no pain with the active or passive range of motion of the right ankle, but decrease ROM. She has intact sensation distally, and she is neurovascularly intact. IMAGING:  Three views right ankle taken today in the office showed anatomic alignment of the fracture, plate and screws in good position, no loosening. Ankle mortise is well centered. Lateral malleolus fracture in anatomic position. IMPRESSION:  2 weeks out from   1-Right ankle distal tibia pilon intra-articular fracture, status post ORIF. 2-Right tibial shaft comminuted fracture, status post ORIF  3-Right lateral malleolus fracture, nonoperative      PLAN: She placed in a boot, and non WB for 10 weeks. I have told the patient to work on ROM. ASA for DVT prophylaxis. The patient will come back for a follow up in 6 weeks. At that time, we will take 3 views of the right ankle standing. Procedures    Spreadshirtg Tall Susan Walking Boot     Patient was prescribed a Spreadshirtg Tall COADE Walking Boot.   The right ankle will require stabilization / immobilization from this semi-rigid / rigid orthosis to improve their function. The orthosis will assist in protecting the affected area, provide functional support and facilitate healing. Patient was instructed to progress ambulation  as non weight bearing in the device. The plan of care is to progress the patient to full weight bearing status. The patient was educated and fit by a healthcare professional with expert knowledge and specialization in brace application while under the direct supervision of the physician. Verbal and written instructions for the use of and application of this item were provided. They were instructed to contact the office immediately should the brace result in increased pain, decreased sensation, increased swelling or worsening of the condition.      Vanda Hall, APRN - CNP

## 2022-12-07 ENCOUNTER — OFFICE VISIT (OUTPATIENT)
Dept: ORTHOPEDIC SURGERY | Age: 47
End: 2022-12-07

## 2022-12-07 VITALS — WEIGHT: 151 LBS | BODY MASS INDEX: 26.75 KG/M2 | HEIGHT: 63 IN

## 2022-12-07 DIAGNOSIS — S82.251D CLOSED DISPLACED COMMINUTED FRACTURE OF SHAFT OF RIGHT TIBIA WITH ROUTINE HEALING, SUBSEQUENT ENCOUNTER: Primary | ICD-10-CM

## 2022-12-07 PROCEDURE — 99024 POSTOP FOLLOW-UP VISIT: CPT | Performed by: NURSE PRACTITIONER

## 2022-12-07 PROCEDURE — APPNB30 APP NON BILLABLE TIME 0-30 MINS: Performed by: NURSE PRACTITIONER

## 2022-12-09 NOTE — PROGRESS NOTES
DIAGNOSIS:    1-Right ankle distal tibia pilon intra-articular fracture, status post ORIF. 2-Right tibial shaft comminuted fracture, status post ORIF  3-Right lateral malleolus fracture, nonoperative    DATE OF SURGERY: 10/12/2022. HISTORY OF PRESENT ILLNESS:  Ms. Dalton 52 y.o.  female who came in today for 8 weeks postoperative visit. The patient denies any significant pain in the right ankle. Rates pain a 0/10 VAS and doing much better. She has been NWB in a boot. No numbness or tingling sensation. No fever or Chills. 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 She is asking for more pain medication. Denies smoking. PHYSICAL EXAMINATION:  The incision healing well. No signs of any erythema or drainage, minimal swelling. She has no pain with the active or passive range of motion of the right ankle, but decrease ROM. She has intact sensation distally, and she is neurovascularly intact. IMAGING:  Three views right ankle taken today in the office showed anatomic alignment of the fracture, plate and screws in good position, no loosening. Ankle mortise is well centered. Lateral malleolus fracture in anatomic position. IMPRESSION:  8 weeks out from   1-Right ankle distal tibia pilon intra-articular fracture, status post ORIF. 2-Right tibial shaft comminuted fracture, status post ORIF  3-Right lateral malleolus fracture, nonoperative      PLAN: She was placed back in a boot. She is TTWB for 2 weeks, the WB in the boot for 2 weeks, then discontinue the boot. I have told the patient to work on ROM. ASA for DVT prophylaxis. The patient will come back for a follow up in 6 weeks. At that time, we will take 3 views of the right ankle standing.       Fernanda Herron, NEISHA - CNP

## 2024-06-01 ENCOUNTER — HOSPITAL ENCOUNTER (EMERGENCY)
Age: 49
Discharge: HOME | End: 2024-06-01
Attending: STUDENT IN AN ORGANIZED HEALTH CARE EDUCATION/TRAINING PROGRAM
Payer: COMMERCIAL

## 2024-06-01 VITALS
OXYGEN SATURATION: 96 % | SYSTOLIC BLOOD PRESSURE: 127 MMHG | RESPIRATION RATE: 16 BRPM | BODY MASS INDEX: 26.58 KG/M2 | TEMPERATURE: 98.8 F | WEIGHT: 150 LBS | DIASTOLIC BLOOD PRESSURE: 85 MMHG | HEART RATE: 84 BPM | HEIGHT: 63 IN

## 2024-06-01 DIAGNOSIS — R19.7 NAUSEA VOMITING AND DIARRHEA: Primary | ICD-10-CM

## 2024-06-01 DIAGNOSIS — R11.2 NAUSEA VOMITING AND DIARRHEA: Primary | ICD-10-CM

## 2024-06-01 LAB
ALBUMIN SERPL-MCNC: 4.2 G/DL (ref 3.4–5)
ALP SERPL-CCNC: 111 U/L (ref 40–129)
ALT SERPL-CCNC: 61 U/L (ref 10–40)
ANION GAP SERPL CALCULATED.3IONS-SCNC: 13 MMOL/L (ref 3–16)
AST SERPL-CCNC: 88 U/L (ref 15–37)
BASE EXCESS BLDV CALC-SCNC: -0.1 MMOL/L (ref -2–3)
BASOPHILS # BLD: 0.1 K/UL (ref 0–0.2)
BASOPHILS NFR BLD: 1.6 %
BILIRUB DIRECT SERPL-MCNC: <0.2 MG/DL (ref 0–0.3)
BILIRUB INDIRECT SERPL-MCNC: ABNORMAL MG/DL (ref 0–1)
BILIRUB SERPL-MCNC: 0.4 MG/DL (ref 0–1)
BUN SERPL-MCNC: 9 MG/DL (ref 7–20)
CALCIUM SERPL-MCNC: 9.2 MG/DL (ref 8.3–10.6)
CHLORIDE SERPL-SCNC: 101 MMOL/L (ref 99–110)
CO2 BLDV-SCNC: 25 MMOL/L
CO2 SERPL-SCNC: 20 MMOL/L (ref 21–32)
COHGB MFR BLDV: 3.8 % (ref 0–1.5)
CREAT SERPL-MCNC: <0.5 MG/DL (ref 0.6–1.1)
DEPRECATED RDW RBC AUTO: 13.2 % (ref 12.4–15.4)
DO-HGB MFR BLDV: 5.7 %
EOSINOPHIL # BLD: 0.1 K/UL (ref 0–0.6)
EOSINOPHIL NFR BLD: 1.4 %
GFR SERPLBLD CREATININE-BSD FMLA CKD-EPI: >90 ML/MIN/{1.73_M2}
GLUCOSE SERPL-MCNC: 96 MG/DL (ref 70–99)
HCG SERPL QL: NEGATIVE
HCO3 BLDV-SCNC: 23.9 MMOL/L (ref 24–28)
HCT VFR BLD AUTO: 40.8 % (ref 36–48)
HGB BLD-MCNC: 14.1 G/DL (ref 12–16)
LIPASE SERPL-CCNC: 103 U/L (ref 13–60)
LYMPHOCYTES # BLD: 1.9 K/UL (ref 1–5.1)
LYMPHOCYTES NFR BLD: 30.6 %
MCH RBC QN AUTO: 36.8 PG (ref 26–34)
MCHC RBC AUTO-ENTMCNC: 34.5 G/DL (ref 31–36)
MCV RBC AUTO: 106.6 FL (ref 80–100)
METHGB MFR BLDV: 0 % (ref 0–1.5)
MONOCYTES # BLD: 0.5 K/UL (ref 0–1.3)
MONOCYTES NFR BLD: 8.4 %
NEUTROPHILS # BLD: 3.6 K/UL (ref 1.7–7.7)
NEUTROPHILS NFR BLD: 58 %
PCO2 BLDV: 36.2 MMHG (ref 41–51)
PH BLDV: 7.43 [PH] (ref 7.35–7.45)
PLATELET # BLD AUTO: 326 K/UL (ref 135–450)
PMV BLD AUTO: 7.6 FL (ref 5–10.5)
PO2 BLDV: 69.3 MMHG (ref 25–40)
POTASSIUM SERPL-SCNC: 4.1 MMOL/L (ref 3.5–5.1)
PROT SERPL-MCNC: 7.5 G/DL (ref 6.4–8.2)
RBC # BLD AUTO: 3.83 M/UL (ref 4–5.2)
SAO2 % BLDV: 94 %
SODIUM SERPL-SCNC: 134 MMOL/L (ref 136–145)
WBC # BLD AUTO: 6.2 K/UL (ref 4–11)

## 2024-06-01 PROCEDURE — 96374 THER/PROPH/DIAG INJ IV PUSH: CPT

## 2024-06-01 PROCEDURE — 93005 ELECTROCARDIOGRAM TRACING: CPT | Performed by: PHYSICIAN ASSISTANT

## 2024-06-01 PROCEDURE — 36415 COLL VENOUS BLD VENIPUNCTURE: CPT

## 2024-06-01 PROCEDURE — 80076 HEPATIC FUNCTION PANEL: CPT

## 2024-06-01 PROCEDURE — 84703 CHORIONIC GONADOTROPIN ASSAY: CPT

## 2024-06-01 PROCEDURE — 99284 EMERGENCY DEPT VISIT MOD MDM: CPT

## 2024-06-01 PROCEDURE — 96375 TX/PRO/DX INJ NEW DRUG ADDON: CPT

## 2024-06-01 PROCEDURE — 80048 BASIC METABOLIC PNL TOTAL CA: CPT

## 2024-06-01 PROCEDURE — 83690 ASSAY OF LIPASE: CPT

## 2024-06-01 PROCEDURE — 85025 COMPLETE CBC W/AUTO DIFF WBC: CPT

## 2024-06-01 PROCEDURE — 82803 BLOOD GASES ANY COMBINATION: CPT

## 2024-06-01 PROCEDURE — 6360000002 HC RX W HCPCS: Performed by: PHYSICIAN ASSISTANT

## 2024-06-01 PROCEDURE — C9113 INJ PANTOPRAZOLE SODIUM, VIA: HCPCS | Performed by: PHYSICIAN ASSISTANT

## 2024-06-01 RX ORDER — PANTOPRAZOLE SODIUM 40 MG/10ML
40 INJECTION, POWDER, LYOPHILIZED, FOR SOLUTION INTRAVENOUS ONCE
Status: COMPLETED | OUTPATIENT
Start: 2024-06-01 | End: 2024-06-01

## 2024-06-01 RX ORDER — PANTOPRAZOLE SODIUM 20 MG/1
40 TABLET, DELAYED RELEASE ORAL DAILY
Qty: 30 TABLET | Refills: 0 | Status: SHIPPED | OUTPATIENT
Start: 2024-06-01

## 2024-06-01 RX ORDER — ONDANSETRON 4 MG/1
4 TABLET, FILM COATED ORAL EVERY 8 HOURS PRN
Qty: 12 TABLET | Refills: 0 | Status: SHIPPED | OUTPATIENT
Start: 2024-06-01

## 2024-06-01 RX ORDER — FLUOXETINE 10 MG/1
10 CAPSULE ORAL DAILY
COMMUNITY

## 2024-06-01 RX ORDER — ONDANSETRON 2 MG/ML
4 INJECTION INTRAMUSCULAR; INTRAVENOUS ONCE
Status: COMPLETED | OUTPATIENT
Start: 2024-06-01 | End: 2024-06-01

## 2024-06-01 RX ADMIN — ONDANSETRON 4 MG: 2 INJECTION INTRAMUSCULAR; INTRAVENOUS at 19:33

## 2024-06-01 RX ADMIN — PANTOPRAZOLE SODIUM 40 MG: 40 INJECTION, POWDER, FOR SOLUTION INTRAVENOUS at 19:33

## 2024-06-01 ASSESSMENT — ENCOUNTER SYMPTOMS
BLOOD IN STOOL: 1
NAUSEA: 1
EYE REDNESS: 0
ABDOMINAL PAIN: 1
VOMITING: 1
SHORTNESS OF BREATH: 0
DIARRHEA: 1

## 2024-06-01 ASSESSMENT — PAIN DESCRIPTION - DESCRIPTORS: DESCRIPTORS: CRAMPING

## 2024-06-01 ASSESSMENT — LIFESTYLE VARIABLES
HOW OFTEN DO YOU HAVE A DRINK CONTAINING ALCOHOL: NEVER
HOW MANY STANDARD DRINKS CONTAINING ALCOHOL DO YOU HAVE ON A TYPICAL DAY: PATIENT DOES NOT DRINK

## 2024-06-01 ASSESSMENT — PAIN DESCRIPTION - LOCATION: LOCATION: ABDOMEN

## 2024-06-01 ASSESSMENT — PAIN SCALES - GENERAL: PAINLEVEL_OUTOF10: 3

## 2024-06-01 ASSESSMENT — PAIN - FUNCTIONAL ASSESSMENT: PAIN_FUNCTIONAL_ASSESSMENT: 0-10

## 2024-06-01 ASSESSMENT — PAIN DESCRIPTION - ORIENTATION: ORIENTATION: UPPER;RIGHT

## 2024-06-01 ASSESSMENT — PAIN DESCRIPTION - PAIN TYPE: TYPE: ACUTE PAIN

## 2024-06-01 ASSESSMENT — PAIN DESCRIPTION - FREQUENCY: FREQUENCY: CONTINUOUS

## 2024-06-01 NOTE — ED PROVIDER NOTES
THE [facility]  EMERGENCY DEPARTMENT ENCOUNTER          PHYSICIAN ASSISTANT NOTE       Date of evaluation: 6/1/2024    Chief Complaint     Rectal Bleeding and Hematemesis (Pt c/o abdominal issues for 2 years. Last 6 months having hematemesis and rectal bleeding x6 months. Also states she passed out while doing lawn care a few days ago)      History of Present Illness     Janeth Dalton is a 49 y.o. female who reports prior history of alcohol abuse, tobacco use who presents with complaints of abdominal pain, nausea, vomiting and diarrhea.  Patient states that she has been having \"abdominal issues\" for the past 2 years.  She states that she goes through the cycles where she has epigastric and right-sided abdominal pain with associated nausea, vomiting and diarrhea.  She states within the past 6 months, she has had emesis intermittently consisting of coffee grounds. She also reports loose stool mixed with bright red blood. She states that she has shaking and night sweats every night. She states that during flare ups of her symptoms, she has difficulty tolerating PO intake. She reports that she is often lightheaded and states that she passed out 4 days ago while mowing the grass. Denies head injury or other injury from this. Denies chest pain, shortness of breath. She reports that she finally got insurance today. She attempted to get into primary care but was unable to get an appt until July. She was encouraged to go to Urgent care or ED.   Patient denies any prior evaluation for these symptoms.  Patient denies known FHX of IBD or cancer.  Patient reports equal to or less than one alcoholic beverage per week.  She is on Prozac. This was started by a PCP years ago and continued by an online provider through [facility].    ASSESSMENT / PLAN  (MEDICAL DECISION MAKING)     INITIAL VITALS: BP: (!) 139/93, Temp: 98.8 °F (37.1 °C), Pulse: 84, Respirations: 16, SpO2: 95 %    Janeth Dalton is a 49 y.o. female with past  - 20 mg/dL    Creatinine <0.5 (L) 0.6 - 1.1 mg/dL    Est, Glom Filt Rate >90 >60    Calcium 9.2 8.3 - 10.6 mg/dL   Hepatic Function Panel   Result Value Ref Range    Total Protein 7.5 6.4 - 8.2 g/dL    Albumin 4.2 3.4 - 5.0 g/dL    Alkaline Phosphatase 111 40 - 129 U/L    ALT 61 (H) 10 - 40 U/L    AST 88 (H) 15 - 37 U/L    Total Bilirubin 0.4 0.0 - 1.0 mg/dL    Bilirubin, Direct <0.2 0.0 - 0.3 mg/dL    Bilirubin, Indirect see below 0.0 - 1.0 mg/dL   Lipase   Result Value Ref Range    Lipase 103.0 (H) 13.0 - 60.0 U/L   Blood Gas, Venous   Result Value Ref Range    pH, Luc 7.428 7.350 - 7.450    pCO2, Luc 36.2 (L) 41.0 - 51.0 mmHg    pO2, Luc 69.3 (H) 25.0 - 40.0 mmHg    HCO3, Venous 23.9 (L) 24.0 - 28.0 mmol/L    Base Excess, Luc -0.1 -2.0 - 3.0 mmol/L    O2 Sat, Luc 94 Not established %    Carboxyhemoglobin 3.8 (H) 0.0 - 1.5 %    MetHgb, Luc 0.0 0.0 - 1.5 %    TC02 (Calc), Luc 25 mmol/L    Hemoglobin, Luc, Reduced 5.70 %   HCG Qualitative, Serum   Result Value Ref Range    Preg, Serum Negative Detects HCG level >10 MIU/mL       EKG   Interpreted in conjunction with emergency department physician Timothy Scott MD  Rhythm: normal sinus   Rate: normal  Axis: normal  Ectopy: none  Conduction: normal  ST Segments: no acute change  T Waves: inversion in  III  Q Waves:none  Clinical Impression: no acute ischemic changes  Comparison:  none    ED BEDSIDE ULTRASOUND:  No results found.    RECENT VITALS:  BP: 127/85, Temp: 98.8 °F (37.1 °C), Pulse: 84, Respirations: 16, SpO2: 96 %     Procedures         ED Course     Nursing Notes, Past Medical Hx,Past Surgical Hx, Social Hx, Allergies, and Family Hx were reviewed.         The patient was given the following medications:  Orders Placed This Encounter   Medications    pantoprazole (PROTONIX) injection 40 mg    ondansetron (ZOFRAN) injection 4 mg       CONSULTS:  None    Review of Systems     Review of Systems   Constitutional:  Positive for activity change, appetite change,

## 2024-06-02 LAB
EKG ATRIAL RATE: 78 BPM
EKG DIAGNOSIS: NORMAL
EKG P AXIS: 43 DEGREES
EKG P-R INTERVAL: 166 MS
EKG Q-T INTERVAL: 390 MS
EKG QRS DURATION: 74 MS
EKG QTC CALCULATION (BAZETT): 444 MS
EKG R AXIS: 6 DEGREES
EKG T AXIS: 25 DEGREES
EKG VENTRICULAR RATE: 78 BPM

## 2024-06-02 NOTE — DISCHARGE INSTRUCTIONS
Start protonix once daily before breakfast.  STOP drinking alcohol.    Follow up with primary care and GI.    Return to the ED with new or worsening symptoms.

## 2024-06-03 NOTE — ED PROVIDER NOTES
ED Attending Attestation Note     Date of evaluation: 6/1/2024    This patient was seen by the advance practice provider.  I have seen and examined the patient, agree with the workup, evaluation, management and diagnosis. The care plan has been discussed.  I have reviewed the ECG and concur with the JENN's interpretation.  My assessment reveals 49-year-old female with history of alcohol abuse who presents with nausea, vomiting, diarrhea.  Symptoms been present for the past 2 years over the past 6 months she states that she has been having what she describes as coffee-ground emesis and blood in her stool.  No evidence of acute GI bleeding at this time.  Hemoglobin is 14.1.  Glascow Blatchford score 0 indicating low risk for mortality from acute GI bleed.  Overall given her well appearance and normal labs feel that she is stable for outpatient management.  Will send her home on Protonix and have her follow-up with GI.    Medical Decision Making  Problems Addressed:  Nausea vomiting and diarrhea: complicated acute illness or injury that poses a threat to life or bodily functions    Amount and/or Complexity of Data Reviewed  Labs: ordered. Decision-making details documented in ED Course.  ECG/medicine tests: ordered and independent interpretation performed. Decision-making details documented in ED Course.    Risk  Prescription drug management.  Decision regarding hospitalization.              Timothy Scott MD  06/03/24 [id number]

## 2024-07-23 ENCOUNTER — HOSPITAL ENCOUNTER (EMERGENCY)
Age: 49
Discharge: ANOTHER HOSPITAL | End: 2024-07-24
Attending: EMERGENCY MEDICINE
Payer: COMMERCIAL

## 2024-07-23 DIAGNOSIS — F10.920 ACUTE ALCOHOLIC INTOXICATION WITHOUT COMPLICATION (HCC): Primary | ICD-10-CM

## 2024-07-23 DIAGNOSIS — R45.851 SUICIDAL IDEATION: ICD-10-CM

## 2024-07-23 LAB
ALBUMIN SERPL-MCNC: 4.4 G/DL (ref 3.4–5)
ALP SERPL-CCNC: 89 U/L (ref 40–129)
ALT SERPL-CCNC: 24 U/L (ref 10–40)
ANION GAP SERPL CALCULATED.3IONS-SCNC: 18 MMOL/L (ref 3–16)
APAP SERPL-MCNC: <5 UG/ML (ref 10–30)
AST SERPL-CCNC: 38 U/L (ref 15–37)
BASOPHILS # BLD: 0.1 K/UL (ref 0–0.2)
BASOPHILS NFR BLD: 0.9 %
BILIRUB DIRECT SERPL-MCNC: <0.2 MG/DL (ref 0–0.3)
BILIRUB INDIRECT SERPL-MCNC: ABNORMAL MG/DL (ref 0–1)
BILIRUB SERPL-MCNC: <0.2 MG/DL (ref 0–1)
BUN SERPL-MCNC: 6 MG/DL (ref 7–20)
CALCIUM SERPL-MCNC: 8.6 MG/DL (ref 8.3–10.6)
CHLORIDE SERPL-SCNC: 106 MMOL/L (ref 99–110)
CO2 SERPL-SCNC: 18 MMOL/L (ref 21–32)
CREAT SERPL-MCNC: <0.5 MG/DL (ref 0.6–1.1)
DEPRECATED RDW RBC AUTO: 12.9 % (ref 12.4–15.4)
EOSINOPHIL # BLD: 0.1 K/UL (ref 0–0.6)
EOSINOPHIL NFR BLD: 1.5 %
ETHANOLAMINE SERPL-MCNC: 259 MG/DL (ref 0–0.08)
GFR SERPLBLD CREATININE-BSD FMLA CKD-EPI: >90 ML/MIN/{1.73_M2}
GLUCOSE SERPL-MCNC: 94 MG/DL (ref 70–99)
HCG SERPL QL: NEGATIVE
HCT VFR BLD AUTO: 38.5 % (ref 36–48)
HGB BLD-MCNC: 13.4 G/DL (ref 12–16)
LYMPHOCYTES # BLD: 3 K/UL (ref 1–5.1)
LYMPHOCYTES NFR BLD: 42 %
MCH RBC QN AUTO: 35.8 PG (ref 26–34)
MCHC RBC AUTO-ENTMCNC: 34.9 G/DL (ref 31–36)
MCV RBC AUTO: 102.7 FL (ref 80–100)
MONOCYTES # BLD: 0.6 K/UL (ref 0–1.3)
MONOCYTES NFR BLD: 9.1 %
NEUTROPHILS # BLD: 3.3 K/UL (ref 1.7–7.7)
NEUTROPHILS NFR BLD: 46.5 %
PLATELET # BLD AUTO: 342 K/UL (ref 135–450)
PMV BLD AUTO: 7.3 FL (ref 5–10.5)
POTASSIUM SERPL-SCNC: 3.9 MMOL/L (ref 3.5–5.1)
PROT SERPL-MCNC: 7.7 G/DL (ref 6.4–8.2)
RBC # BLD AUTO: 3.75 M/UL (ref 4–5.2)
SALICYLATES SERPL-MCNC: <0.3 MG/DL (ref 15–30)
SODIUM SERPL-SCNC: 142 MMOL/L (ref 136–145)
WBC # BLD AUTO: 7.1 K/UL (ref 4–11)

## 2024-07-23 PROCEDURE — 80179 DRUG ASSAY SALICYLATE: CPT

## 2024-07-23 PROCEDURE — 36415 COLL VENOUS BLD VENIPUNCTURE: CPT

## 2024-07-23 PROCEDURE — 85025 COMPLETE CBC W/AUTO DIFF WBC: CPT

## 2024-07-23 PROCEDURE — 6370000000 HC RX 637 (ALT 250 FOR IP): Performed by: PHYSICIAN ASSISTANT

## 2024-07-23 PROCEDURE — 82077 ASSAY SPEC XCP UR&BREATH IA: CPT

## 2024-07-23 PROCEDURE — 80048 BASIC METABOLIC PNL TOTAL CA: CPT

## 2024-07-23 PROCEDURE — 80143 DRUG ASSAY ACETAMINOPHEN: CPT

## 2024-07-23 PROCEDURE — 80076 HEPATIC FUNCTION PANEL: CPT

## 2024-07-23 PROCEDURE — 84703 CHORIONIC GONADOTROPIN ASSAY: CPT

## 2024-07-23 PROCEDURE — 99285 EMERGENCY DEPT VISIT HI MDM: CPT

## 2024-07-23 RX ORDER — LORAZEPAM 1 MG/1
1 TABLET ORAL ONCE
Status: COMPLETED | OUTPATIENT
Start: 2024-07-23 | End: 2024-07-23

## 2024-07-23 RX ADMIN — LORAZEPAM 1 MG: 1 TABLET ORAL at 21:53

## 2024-07-24 ENCOUNTER — HOSPITAL ENCOUNTER (INPATIENT)
Age: 49
LOS: 4 days | Discharge: HOME | DRG: 885 | End: 2024-07-28
Attending: PSYCHIATRY & NEUROLOGY | Admitting: PSYCHIATRY & NEUROLOGY
Payer: COMMERCIAL

## 2024-07-24 VITALS
SYSTOLIC BLOOD PRESSURE: 127 MMHG | HEIGHT: 63 IN | TEMPERATURE: 97.2 F | OXYGEN SATURATION: 99 % | DIASTOLIC BLOOD PRESSURE: 79 MMHG | RESPIRATION RATE: 16 BRPM | HEART RATE: 87 BPM | WEIGHT: 156.53 LBS | BODY MASS INDEX: 27.73 KG/M2

## 2024-07-24 PROBLEM — F33.9 MAJOR DEPRESSIVE DISORDER, RECURRENT (HCC): Status: ACTIVE | Noted: 2024-07-24

## 2024-07-24 PROBLEM — F32.9 MAJOR DEPRESSIVE DISORDER WITH SINGLE EPISODE: Status: ACTIVE | Noted: 2024-07-24

## 2024-07-24 LAB
AMPHETAMINES UR QL SCN>1000 NG/ML: ABNORMAL
BARBITURATES UR QL SCN>200 NG/ML: ABNORMAL
BENZODIAZ UR QL SCN>200 NG/ML: ABNORMAL
CANNABINOIDS UR QL SCN>50 NG/ML: POSITIVE
COCAINE UR QL SCN: ABNORMAL
DRUG SCREEN COMMENT UR-IMP: ABNORMAL
ETHANOLAMINE SERPL-MCNC: 119 MG/DL (ref 0–0.08)
ETHANOLAMINE SERPL-MCNC: 32 MG/DL (ref 0–0.08)
FENTANYL SCREEN, URINE: ABNORMAL
METHADONE UR QL SCN>300 NG/ML: ABNORMAL
OPIATES UR QL SCN>300 NG/ML: ABNORMAL
OXYCODONE UR QL SCN: ABNORMAL
PCP UR QL SCN>25 NG/ML: ABNORMAL
PH UR STRIP: 5.5 [PH]

## 2024-07-24 PROCEDURE — 80307 DRUG TEST PRSMV CHEM ANLYZR: CPT

## 2024-07-24 PROCEDURE — 6370000000 HC RX 637 (ALT 250 FOR IP)

## 2024-07-24 PROCEDURE — 1240000000 HC EMOTIONAL WELLNESS R&B

## 2024-07-24 PROCEDURE — 82077 ASSAY SPEC XCP UR&BREATH IA: CPT

## 2024-07-24 RX ORDER — DIPHENHYDRAMINE HYDROCHLORIDE 50 MG/ML
50 INJECTION INTRAMUSCULAR; INTRAVENOUS EVERY 4 HOURS PRN
Status: DISCONTINUED | OUTPATIENT
Start: 2024-07-24 | End: 2024-07-25

## 2024-07-24 RX ORDER — IBUPROFEN 400 MG/1
400 TABLET ORAL EVERY 6 HOURS PRN
Status: DISCONTINUED | OUTPATIENT
Start: 2024-07-24 | End: 2024-07-25

## 2024-07-24 RX ORDER — MAGNESIUM HYDROXIDE/ALUMINUM HYDROXICE/SIMETHICONE 120; 1200; 1200 MG/30ML; MG/30ML; MG/30ML
30 SUSPENSION ORAL EVERY 6 HOURS PRN
Status: DISCONTINUED | OUTPATIENT
Start: 2024-07-24 | End: 2024-07-28 | Stop reason: HOSPADM

## 2024-07-24 RX ORDER — POLYETHYLENE GLYCOL 3350 17 G
2 POWDER IN PACKET (EA) ORAL
Status: DISCONTINUED | OUTPATIENT
Start: 2024-07-24 | End: 2024-07-28 | Stop reason: HOSPADM

## 2024-07-24 RX ORDER — OLANZAPINE 5 MG/1
5 TABLET ORAL EVERY 4 HOURS PRN
Status: DISCONTINUED | OUTPATIENT
Start: 2024-07-24 | End: 2024-07-25

## 2024-07-24 RX ORDER — ACETAMINOPHEN 325 MG/1
650 TABLET ORAL EVERY 4 HOURS PRN
Status: DISCONTINUED | OUTPATIENT
Start: 2024-07-24 | End: 2024-07-25

## 2024-07-24 RX ORDER — TRAZODONE HYDROCHLORIDE 50 MG/1
50 TABLET ORAL NIGHTLY PRN
Status: DISCONTINUED | OUTPATIENT
Start: 2024-07-24 | End: 2024-07-28 | Stop reason: HOSPADM

## 2024-07-24 RX ORDER — HYDROXYZINE 50 MG/1
50 TABLET, FILM COATED ORAL 3 TIMES DAILY PRN
Status: DISCONTINUED | OUTPATIENT
Start: 2024-07-24 | End: 2024-07-28 | Stop reason: HOSPADM

## 2024-07-24 RX ADMIN — NICOTINE POLACRILEX 2 MG: 2 LOZENGE ORAL at 14:42

## 2024-07-24 RX ADMIN — TRAZODONE HYDROCHLORIDE 50 MG: 50 TABLET ORAL at 21:07

## 2024-07-24 RX ADMIN — HYDROXYZINE HYDROCHLORIDE 50 MG: 50 TABLET, FILM COATED ORAL at 14:40

## 2024-07-24 RX ADMIN — NICOTINE POLACRILEX 2 MG: 2 LOZENGE ORAL at 21:07

## 2024-07-24 ASSESSMENT — PATIENT HEALTH QUESTIONNAIRE - PHQ9
6. FEELING BAD ABOUT YOURSELF - OR THAT YOU ARE A FAILURE OR HAVE LET YOURSELF OR YOUR FAMILY DOWN: NEARLY EVERY DAY
SUM OF ALL RESPONSES TO PHQ QUESTIONS 1-9: 21
1. LITTLE INTEREST OR PLEASURE IN DOING THINGS: NEARLY EVERY DAY
7. TROUBLE CONCENTRATING ON THINGS, SUCH AS READING THE NEWSPAPER OR WATCHING TELEVISION: NOT AT ALL
10. IF YOU CHECKED OFF ANY PROBLEMS, HOW DIFFICULT HAVE THESE PROBLEMS MADE IT FOR YOU TO DO YOUR WORK, TAKE CARE OF THINGS AT HOME, OR GET ALONG WITH OTHER PEOPLE: EXTREMELY DIFFICULT
SUM OF ALL RESPONSES TO PHQ QUESTIONS 1-9: 21
1. LITTLE INTEREST OR PLEASURE IN DOING THINGS: NEARLY EVERY DAY
SUM OF ALL RESPONSES TO PHQ9 QUESTIONS 1 & 2: 6
2. FEELING DOWN, DEPRESSED OR HOPELESS: NEARLY EVERY DAY
3. TROUBLE FALLING OR STAYING ASLEEP: NEARLY EVERY DAY
7. TROUBLE CONCENTRATING ON THINGS, SUCH AS READING THE NEWSPAPER OR WATCHING TELEVISION: NOT AT ALL
2. FEELING DOWN, DEPRESSED OR HOPELESS: NEARLY EVERY DAY
6. FEELING BAD ABOUT YOURSELF - OR THAT YOU ARE A FAILURE OR HAVE LET YOURSELF OR YOUR FAMILY DOWN: NEARLY EVERY DAY
5. POOR APPETITE OR OVEREATING: NEARLY EVERY DAY
4. FEELING TIRED OR HAVING LITTLE ENERGY: NEARLY EVERY DAY
SUM OF ALL RESPONSES TO PHQ QUESTIONS 1-9: 18
8. MOVING OR SPEAKING SO SLOWLY THAT OTHER PEOPLE COULD HAVE NOTICED. OR THE OPPOSITE, BEING SO FIGETY OR RESTLESS THAT YOU HAVE BEEN MOVING AROUND A LOT MORE THAN USUAL: NOT AT ALL
9. THOUGHTS THAT YOU WOULD BE BETTER OFF DEAD, OR OF HURTING YOURSELF: NEARLY EVERY DAY
5. POOR APPETITE OR OVEREATING: NEARLY EVERY DAY
4. FEELING TIRED OR HAVING LITTLE ENERGY: NEARLY EVERY DAY
SUM OF ALL RESPONSES TO PHQ QUESTIONS 1-9: 21
SUM OF ALL RESPONSES TO PHQ QUESTIONS 1-9: 18
SUM OF ALL RESPONSES TO PHQ9 QUESTIONS 1 & 2: 6
SUM OF ALL RESPONSES TO PHQ QUESTIONS 1-9: 21
10. IF YOU CHECKED OFF ANY PROBLEMS, HOW DIFFICULT HAVE THESE PROBLEMS MADE IT FOR YOU TO DO YOUR WORK, TAKE CARE OF THINGS AT HOME, OR GET ALONG WITH OTHER PEOPLE: VERY DIFFICULT
SUM OF ALL RESPONSES TO PHQ QUESTIONS 1-9: 21
8. MOVING OR SPEAKING SO SLOWLY THAT OTHER PEOPLE COULD HAVE NOTICED. OR THE OPPOSITE, BEING SO FIGETY OR RESTLESS THAT YOU HAVE BEEN MOVING AROUND A LOT MORE THAN USUAL: NOT AT ALL
SUM OF ALL RESPONSES TO PHQ QUESTIONS 1-9: 21
3. TROUBLE FALLING OR STAYING ASLEEP: NEARLY EVERY DAY
9. THOUGHTS THAT YOU WOULD BE BETTER OFF DEAD, OR OF HURTING YOURSELF: NEARLY EVERY DAY

## 2024-07-24 ASSESSMENT — PAIN SCALES - GENERAL: PAINLEVEL_OUTOF10: 0

## 2024-07-24 ASSESSMENT — SLEEP AND FATIGUE QUESTIONNAIRES
DO YOU USE A SLEEP AID: NO
DO YOU HAVE DIFFICULTY SLEEPING: YES
SLEEP PATTERN: DIFFICULTY FALLING ASLEEP;DISTURBED/INTERRUPTED SLEEP;EARLY AWAKENING;INSOMNIA;RESTLESSNESS;NIGHTMARES/TERRORS
SLEEP PATTERN: DIFFICULTY FALLING ASLEEP;DISTURBED/INTERRUPTED SLEEP;RESTLESSNESS;NIGHTMARES/TERRORS
DO YOU USE A SLEEP AID: NO
AVERAGE NUMBER OF SLEEP HOURS: 4
AVERAGE NUMBER OF SLEEP HOURS: 4
DO YOU HAVE DIFFICULTY SLEEPING: YES

## 2024-07-24 ASSESSMENT — ENCOUNTER SYMPTOMS
COLOR CHANGE: 0
SHORTNESS OF BREATH: 0

## 2024-07-24 ASSESSMENT — LIFESTYLE VARIABLES
HOW MANY STANDARD DRINKS CONTAINING ALCOHOL DO YOU HAVE ON A TYPICAL DAY: 1 OR 2
HOW OFTEN DO YOU HAVE A DRINK CONTAINING ALCOHOL: MONTHLY OR LESS

## 2024-07-24 NOTE — BH NOTE
Patient checked by security and arrived on unit. Patient tearful at times. Patient belongings secured behind nurses station and patient placed in a safety gown. Patient offered fluids and snacks. 1:1 initiated upon arrival to unit

## 2024-07-24 NOTE — VIRTUAL HEALTH
Janeth Dalton  [id number]  1975     EMERGENCY DEPARTMENT TELEPSYCHIATRY EVALUATION    07/24/24    Chief Complaint:  “I am just feeling suicidal”  HPI: Patient is a 49 y.o.  female who presents for Psychiatric Evaluation. Patient presented to the ED on 07/24/24 from home. Per ED Documentation: \"Patient to the ER with complaints of suicidal ideation. Patient says she has a chronic issue with her abdomen that has been going on x2 years.  She sees GI and has many tests run and they cannot find out what is wrong with her.  This has led her to feel suicidal. Patient says the thoughts of suicide have been getting worse over the past 3 days. Her plan is to slit her wrists in the bathtub. Patient says she did not harm herself tonight because she thought of her parents and how devastated they would be. Patient denies use of alcohol/drugs (occassional marijuana use, but denies hard drugs).Patient reports previous suicide attempt about 20 years ago. She is tearful at time of triage.\"    Patient currently ordered suicide precautions and 1:1 constant observer.     Patient reports presenting to the ED due to feelings of suicidal ideations.  She states that she had planned to cut her wrists.  It was reported that patient felt of her bathtub and was planning on sitting in her bathtub with her wrist cut.  However she was not able to get into the bathtub and act on this.  She reports currently she is now feeling \"deflated\", \"overwhelmed\", and \"sad\".  Patient is tearful throughout assessment.  She states that she has had multiple pressors occur in her life recently including issues with her son, health issues, and financial problems.  She states that it has all been happening at once and has been overwhelming for her.  Patient reports that she has previously attempted suicide in the past and states that she did this by cutting her wrist.  She reports that this did not result in a hospital admission and states that she

## 2024-07-24 NOTE — GROUP NOTE
Group Therapy Note    Date: 7/24/2024    Group Start Time: 1100  Group End Time: 1145  Group Topic: Group Therapy    [facility]    Kavita Dorantes, RT        Group Therapy Note    Attendees: 6    Group topic was focused on self reflection and gratitude practice.  Group discussed journaling as a healthy outlet before engaging in a gratitude journal exercise.  Journal prompts provided structure for group discussion and participants were invited to share.      Notes:  Pt was present for a portion of group due to just getting up to the unit.  Pt sat with group members initially and then was excused from group to meet with nursing to complete her admission process.      Discipline Responsible: Psychoeducational Specialist and Recreational Therapist      Signature:  Kavita Dorantes MA, CTRS

## 2024-07-24 NOTE — BH NOTE
[facility]  Admission Note     Admission Type:   Admission Type: Involuntary (Signed Voluntary)    Reason for admission:  Reason for Admission: Increased Depression and suicidal thoughts over stress at work, son going to jail and undiagnosed abdominal pain over 2 years. Thoughts of filling the bathtub and slitting her wrist.      Addictive Behavior:   Addictive Behavior  In the Past 3 Months, Have You Felt or Has Someone Told You That You Have a Problem With  : Eating (too much/too little), Excessive fluid intake, Shopping    Medical Problems:   History reviewed. No pertinent past medical history.    Status EXAM:  Mental Status and Behavioral Exam  Normal: No  Level of Assistance: Independent/Self  Facial Expression: Sad, Worried  Affect: Congruent  Level of Consciousness: Alert  Frequency of Checks: 4 times per hour, close  Mood:Normal: No  Mood: Depressed, Anxious, Sad, Empty, Helpless  Motor Activity:Normal: Yes  Eye Contact: Good  Observed Behavior: Cooperative, Friendly  Sexual Misconduct History: Current - no  Preception: [place] to person, [place] to time, [place] to place, [place] to situation  Attention:Normal: No  Attention: Distractible  Thought Processes: Unremarkable  Thought Content:Normal: Yes  Depression Symptoms: Appetite change, Change in energy level, Crying, Feelings of helplessness, Feelings of hopelessess, Feelings of worthlessness, Impaired concentration, Isolative, Loss of interest, Sleep disturbance  Anxiety Symptoms: Generalized  Sheba Symptoms: No problems reported or observed.  Hallucinations: None  Delusions: No  Memory:Normal: Yes  Insight and Judgment: No  Insight and Judgment: Poor judgment    Tobacco Screening:  Practical Counseling, on admission, stephenie X, if applicable and completed (first 3 are required if patient doesn't refuse):            ( ) Recognizing danger situations (included triggers and roadblocks)                    ( ) Coping skills (new ways to manage

## 2024-07-24 NOTE — ED NOTES
Report received from Prudence CHEEMA. All questions answered.         Provider aware patient is at high risk for suicide. Patient placed in suicide precautions per order.     Patient belongings removed by two staff members. Patient belongings logged and secured in Locker #4.     Patient placed in gown without ties. Patient room removed of any unnecessary hazards including excess furniture, extra supplies, extra cords and assessed for additional environmental ligature risks. Constant observer at bedside.

## 2024-07-24 NOTE — BH NOTE
4 Eyes Skin Assessment     The patient is being assessed for  Admission    I agree that 2 RN's have performed a thorough Head to Toe Skin Assessment on the patient. ALL assessment sites listed below have been assessed.       Areas assessed for pressure by both nurses:   [x]   Head, Face, and Ears   [x]   Shoulders, Back, and Chest  [x]   Arms, Elbows, and Hands   [x]   Coccyx, Sacrum, and Ischum  [x]   Legs, Feet, and Heels                                Skin Assessed Under all Medical Devices by both nurses:  None in place               All Mepilex Borders were peeled back and area peeked at by both nurses:  No: N/A  Please list where Mepilex Borders are located:  N/A                 Does the Patient have Skin Breakdown related to pressure?  No              Keron Prevention initiated:  NA   Wound Care Orders initiated:  NA      [facility] nurse consulted for Pressure Injury (Stage 3,4, Unstageable, DTI, NWPT, Complex wounds)and New or Established Ostomies:  NA        Nurse 1 eSignature: Electronically signed by Yudy Valdez RN on 7/24/24 at 4:23 PM EDT    **SHARE this note so that the co-signing nurse is able to place an eSignature**    Nurse 2 eSignature: Electronically signed by Mary Beth Nielsen RN on 7/24/24 at 5:04 PM EDT
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
## 2024-07-24 NOTE — TRANSFER CENTER NOTE
completed, if no test is not required)         Last COVID Lab No results found for: \"SARS-COV-2\", \"SARS-COV-2 RNA\", \"SARS-COV-2 RNA, RT PCR\", \"SARS-COV-2, ROMELIA\", \"SARS-COV-2, NAAT\", \"SARS-COV-2 BY PCR\", \"RAPID\", \"SARS-COV-2, RAPID\", \"SALIVA\", \"SARS-COV-2, SALIVA\"               Immunization status:   There is no immunization history on file for this patient.

## 2024-07-24 NOTE — ED PROVIDER NOTES
Emergency Department Attending Provider Note  Location: [facility] EMERGENCY DEPARTMENT  7/23/2024   Note Started: 10:53 PM EDT 7/23/24     THIS IS MY JENN SUPERVISORY AND SHARED VISIT NOTE:    Patient Identification  Janeth Dalton is a 49 y.o. female      HPI:Janeth Dalton was evaluated in the Emergency Department for evaluation of suicidal ideation.  Patient states that she has a history of chronic abdominal pain which has been going on over the past 2 years but has not had any diagnosis which has been causing her distress.  Patient also states that her son recently was incarcerated which has increased her suicidal ideation.  States that has worsened over the past 3 days.  States she has a plan to slit her wrists in the bathtub.  States that she did actually fill up the bathtub 2 times but did not actually end up cutting herself.  Patient denies any drug use but does drink alcohol and did drink alcohol tonight.  Denies any daily alcohol use.  States she does have a history of a suicide attempt 20 years ago.  She denies any homicidal ideation.  No chest pain or shortness of breath.  Fevers or chills.  No nausea or vomiting.  Although initial history and physical exam information was obtained by JENN/NPP (who also dictated a record of this visit), I personally saw the patient and made/approved the management plan and take responsibility for the patient management.       PHYSICAL EXAM:     CONSTITUTIONAL: AOx4, cooperative with exam, afebrile, tearful   HEAD: normocephalic, atraumatic   EYES: PERRL, EOMI, anicteric sclera   ENT: Moist mucous membranes, uvula midline   NECK: Supple, symmetric, trachea midline   LUNGS: Bilateral breath sounds, CTAB, no rales/ronchi/wheezes   CARDIOVASCULAR: RRR, normal S1/S2, no m/r/g, 2+ pulses throughout   ABDOMEN: Soft, non-tender, non-distended, +BS   NEUROLOGIC:  MAEx4, 5/5 strength throughout; fine touch sensation intact throughout; GCS 15   MUSCULOSKELETAL: No

## 2024-07-24 NOTE — ED NOTES
Pt d/c to [address] at this time. Transported via [facility].  Report given to Yudy CHEEMA.

## 2024-07-24 NOTE — ED PROVIDER NOTES
[facility] EMERGENCY DEPARTMENT  EMERGENCY DEPARTMENT ENCOUNTER        Pt Name: Janeth Dalton  MRN: [id number]  Birthdate 1975  Date of evaluation: 7/23/2024  Provider: ANJALI Noonan  PCP: No primary care provider on file.  Note Started: 12:22 AM EDT 7/24/24       I have seen and evaluated this patient with my supervising physician Dr. Odom.      CHIEF COMPLAINT       Chief Complaint   Patient presents with    Suicidal       HISTORY OF PRESENT ILLNESS: 1 or more Elements     History from : Patient    Limitations to history : Intoxication    Janeth Dalton is a 49 y.o. female who presents via private vehicle with complaint of suicidal ideation.  She tells me she has had increasing depression after her son went to snf recently and she has had increased rest at work.  She tells me also \"I am just tired of being sick.\"  She tells me she has been having stomach problems that have been undiagnosed despite multiple visits to GI and the ER over the past 2 years.  She tells me that she been taking Prozac and has been on this for a long time no other medications.  She tells me that she been having thoughts of hurting herself.  She [facility] her [facility] and he recommended she come to the emergency department.  She tells me she specifically thought of feeling her bathtub with water at home and cutting her wrist.  She tells me as a child she cut her wrist but otherwise has not hurt herself in the past and denies any adult hospitalizations for mental health reasons.  She tells me that she lives at home alone she was out to dinner tonight and had a couple of glasses of wine.  She smokes marijuana but otherwise denies drug use.  She tells me she does not want to be dead because she has a grandchild that she loves and that her family would be sad.    Nursing Notes were all reviewed and agreed with or any disagreements were addressed in the HPI.    REVIEW OF SYSTEMS :

## 2024-07-24 NOTE — ED NOTES
Pt's mother, Sommer updated on pt's plan of care at this time. Ok per Pt.      Sitter remains at bedside

## 2024-07-24 NOTE — ED NOTES
Transfer Center Handoff for Behavioral Health Transfers      Patient's Current Location: [facility] EMERGENCY DEPARTMENT     Chief Complaint   Patient presents with    Suicidal       Current or History of Violent Behavior: No    Currently in Restraints Now or During this Encounter: No  (Specify if Agitation or self harm is noted in ED?)  If yes, please describe behaviors requiring restraint:             Medical Clearance Documented and Verified in the Chart: Yes    Allergies   Allergen Reactions    Pcn [Penicillins]         Can Patient Tolerate Lying Flat: Yes    Able to Perform ADLs:  Yes  (Specify if able to ambulate or uses any mobility devices such as cane or walker)  Activity:    Level of Assistance:    Assistive Device:    Miscellaneous Devices:      LABS    CBC:   Lab Results   Component Value Date/Time    WBC 7.1 07/23/2024 09:43 PM    RBC 3.75 07/23/2024 09:43 PM    HGB 13.4 07/23/2024 09:43 PM    HCT 38.5 07/23/2024 09:43 PM    .7 07/23/2024 09:43 PM    MCH 35.8 07/23/2024 09:43 PM    MCHC 34.9 07/23/2024 09:43 PM    RDW 12.9 07/23/2024 09:43 PM     07/23/2024 09:43 PM    MPV 7.3 07/23/2024 09:43 PM     CMP:   Lab Results   Component Value Date/Time     07/23/2024 09:43 PM    K 3.9 07/23/2024 09:43 PM    K 4.1 06/01/2024 07:31 PM     07/23/2024 09:43 PM    CO2 18 07/23/2024 09:43 PM    BUN 6 07/23/2024 09:43 PM    CREATININE <0.5 07/23/2024 09:43 PM    GFRAA >60 10/11/2022 05:06 PM    GFRAA >60 02/21/2010 10:55 AM    LABGLOM >90 07/23/2024 09:43 PM    GLUCOSE 94 07/23/2024 09:43 PM    CALCIUM 8.6 07/23/2024 09:43 PM    BILITOT <0.2 07/23/2024 10:36 PM    ALKPHOS 89 07/23/2024 10:36 PM    AST 38 07/23/2024 10:36 PM    ALT 24 07/23/2024 10:36 PM     Drug Panel: No results found for: \"AMPHETAMUR\", \"BARBITURATUR\", \"COCAINEUR\", \"METHADU\", \"OPIAU\", \"THCUR\", \"LABCOMM\"  UA:No results found for: \"COLORU\", \"APPEARANCE\", \"LABPH\", \"PROTEINU\", \"GLUCOSEU\", \"KETUA\",

## 2024-07-24 NOTE — BH NOTE
CSSR-S Assessment completed with patient who then scored HIGH RISK.     Provider Kim Urias NP was notified of HIGH RISK score, via Telephone at 1150.      Were suicide precautions ordered: YES, But discontinued.    If not ordered, justification as follows: Patient denies Suicide ideation and states she will notify staff if she is not able to remain safe or having suicidal thoughts.     Completed by: Yudy Valedz RN

## 2024-07-24 NOTE — ED TRIAGE NOTES
Patient to the ER with complaints of suicidal ideation.   Patient says she has a chronic issue with her abdomen that has been going on x2 years.  She sees GI and has many tests run and they cannot find out what is wrong with her.  This has led her to feel suicidal.   Patient says the thoughts of suicide have been getting worse over the past 3 days.   Her plan is to slit her wrists in the bathtub.   Patient says she did not harm herself tonight because she thought of her parents and how devastated they would be.     Patient denies use of alcohol/drugs (occassional marijuana use, but denies hard drugs).  Patient reports previous suicide attempt about 20 years ago.   She is tearful at time of triage.  Alert and oriented x4 and ambulatory with a steady gait.

## 2024-07-24 NOTE — ED PROVIDER NOTES
Attending Supervisory Note/Shared Visit   I have personally performed a face to face diagnostic evaluation on this patient. I have reviewed the mid-level’s findings and agree.  History and Exam by me shows well-appearing female no acute distress.  Presented the ED with reports of suicide ideation.  Also reported alcohol use was intoxicated.  Patient was signed out by the oncoming providers pending improvement of ethanol level for psychiatric evaluation by telepsychiatry.     On exam patient is awake and alert answer questions appropriately.  She is regular rate and rhythm and lungs dilatation with normal respiratory effort.  She is moving all extremities purposefully.     Patient's repeat ethanol level was within normal limits.  She was assessed by telepsychiatry and they recommended admission for further mental health evaluation.  Patient's laboratory workup was otherwise unremarkable and she is medically cleared for transfer to a mental health facility.     I agree with the JENN plan of care. Please JENN Note for full ED course and final disposition      Disposition:  1. Acute alcoholic intoxication without complication (HCC)          Jonathan Dorantes MD  Attending Emergency Physician         Jonathan Dorantes MD  07/24/24 3013

## 2024-07-25 PROBLEM — R10.9 CHRONIC ABDOMINAL PAIN: Status: ACTIVE | Noted: 2024-07-25

## 2024-07-25 PROBLEM — Z78.9 ALCOHOL USE: Status: ACTIVE | Noted: 2024-07-25

## 2024-07-25 PROBLEM — F10.20 ALCOHOL USE DISORDER, SEVERE, DEPENDENCE (HCC): Status: ACTIVE | Noted: 2024-07-25

## 2024-07-25 PROBLEM — G89.29 CHRONIC ABDOMINAL PAIN: Status: ACTIVE | Noted: 2024-07-25

## 2024-07-25 PROBLEM — F33.2 SEVERE EPISODE OF RECURRENT MAJOR DEPRESSIVE DISORDER, WITHOUT PSYCHOTIC FEATURES (HCC): Status: ACTIVE | Noted: 2024-07-24

## 2024-07-25 PROCEDURE — 1240000000 HC EMOTIONAL WELLNESS R&B

## 2024-07-25 PROCEDURE — 6370000000 HC RX 637 (ALT 250 FOR IP): Performed by: PSYCHIATRY & NEUROLOGY

## 2024-07-25 PROCEDURE — 99223 1ST HOSP IP/OBS HIGH 75: CPT | Performed by: PSYCHIATRY & NEUROLOGY

## 2024-07-25 PROCEDURE — 99221 1ST HOSP IP/OBS SF/LOW 40: CPT | Performed by: NURSE PRACTITIONER

## 2024-07-25 PROCEDURE — 6370000000 HC RX 637 (ALT 250 FOR IP): Performed by: NURSE PRACTITIONER

## 2024-07-25 PROCEDURE — 6370000000 HC RX 637 (ALT 250 FOR IP)

## 2024-07-25 RX ORDER — PANTOPRAZOLE SODIUM 40 MG/1
40 TABLET, DELAYED RELEASE ORAL DAILY
Status: DISCONTINUED | OUTPATIENT
Start: 2024-07-25 | End: 2024-07-28 | Stop reason: HOSPADM

## 2024-07-25 RX ORDER — FLUOXETINE HYDROCHLORIDE 20 MG/1
40 CAPSULE ORAL DAILY
Status: DISCONTINUED | OUTPATIENT
Start: 2024-07-25 | End: 2024-07-27

## 2024-07-25 RX ORDER — NALTREXONE HYDROCHLORIDE 50 MG/1
25 TABLET, FILM COATED ORAL
Status: COMPLETED | OUTPATIENT
Start: 2024-07-25 | End: 2024-07-25

## 2024-07-25 RX ORDER — NALTREXONE HYDROCHLORIDE 50 MG/1
50 TABLET, FILM COATED ORAL
Status: DISCONTINUED | OUTPATIENT
Start: 2024-07-26 | End: 2024-07-28 | Stop reason: HOSPADM

## 2024-07-25 RX ORDER — ACETAMINOPHEN 325 MG/1
650 TABLET ORAL EVERY 8 HOURS PRN
Status: DISCONTINUED | OUTPATIENT
Start: 2024-07-25 | End: 2024-07-28 | Stop reason: HOSPADM

## 2024-07-25 RX ADMIN — FLUOXETINE HYDROCHLORIDE 40 MG: 20 CAPSULE ORAL at 12:58

## 2024-07-25 RX ADMIN — NICOTINE POLACRILEX 2 MG: 2 LOZENGE ORAL at 18:43

## 2024-07-25 RX ADMIN — NICOTINE POLACRILEX 2 MG: 2 LOZENGE ORAL at 16:19

## 2024-07-25 RX ADMIN — NICOTINE POLACRILEX 2 MG: 2 LOZENGE ORAL at 22:12

## 2024-07-25 RX ADMIN — TRAZODONE HYDROCHLORIDE 50 MG: 50 TABLET ORAL at 22:12

## 2024-07-25 RX ADMIN — NICOTINE POLACRILEX 2 MG: 2 LOZENGE ORAL at 13:01

## 2024-07-25 RX ADMIN — PANTOPRAZOLE SODIUM 40 MG: 40 TABLET, DELAYED RELEASE ORAL at 12:17

## 2024-07-25 RX ADMIN — NALTREXONE HYDROCHLORIDE 25 MG: 50 TABLET, FILM COATED ORAL at 12:58

## 2024-07-25 RX ADMIN — NICOTINE POLACRILEX 2 MG: 2 LOZENGE ORAL at 09:32

## 2024-07-25 ASSESSMENT — PAIN SCALES - GENERAL: PAINLEVEL_OUTOF10: 0

## 2024-07-25 NOTE — GROUP NOTE
Group Therapy Note    Date: 7/25/2024    Group Start Time: 1000  Group End Time: 1045  Group Topic: Psychoeducation    [facility]    Kavita Dorantes,         Group Therapy Note    Attendees: 8    Group was centered on coping skills education and understanding the difference between adaptive and maladaptive coping strategies.  Group processed how unhealthy habits are formed and why people may resort to destructive coping methods.  After, participants were given a handout with examples of adaptive coping skills and methods to deescalate while on the unit.  Group discussion included therapeutic concepts such as positive affirmations, grounding, and breathing techniques.      Notes:  Pt was present and engaged across session.  Participated in group discussion and receptive to information provided.  Pt inquired about coping strategies available in a moments notice.  Therapist provided education on grounding techniques for anxiety.  Pt remained appropriate with others and met goal for group.    Status After Intervention:  Improved    Participation Level: Active Listener and Interactive    Participation Quality: Appropriate, Attentive, Sharing, and Supportive      Speech:  normal      Thought Process/Content: Logical      Affective Functioning: Congruent      Mood: anxious      Level of consciousness:  Alert, Oriented x4, and Attentive      Response to Learning: Able to verbalize current knowledge/experience, Able to verbalize/acknowledge new learning, and Progressing to goal      Endings: None Reported    Modes of Intervention: Education, Support, Socialization, Exploration, Clarifying, Problem-solving, and Activity      Discipline Responsible: Psychoeducational Specialist and Recreational Therapist      Signature:  Kavita Dorantes MA, CTRS

## 2024-07-25 NOTE — PROGRESS NOTES
07/25/24 1458   Encounter Summary   Encounter Overview/Reason Initial Encounter;Behavioral Health   Service Provided For Patient   Last Encounter  07/25/24   Complexity of Encounter Moderate   Begin Time 1445   End Time  1455   Total Time Calculated 10 min   Spiritual/Emotional needs   Type Spiritual Support   Behavioral Health    Type  Initial Encounter   Assessment/Intervention/Outcome   Assessment Calm;Coping;Hopeful   Intervention Active listening;Empowerment;Discussed relationship with God;Discussed meaning/purpose;Read/Provided Scripture;Sustaining Presence/Ministry of presence   Outcome Comfort;Connection/Belonging;Coping;Encouraged;Engaged in conversation;Expressed Gratitude     Chaplain Liv Mcelroy EdD, DINESH MA (LPM)

## 2024-07-25 NOTE — H&P
[address] [address]                           HISTORY & PHYSICAL      PATIENT NAME: PORTIA ARTEAGA               : 1975  MED REC NO: [id number]                      ROOM: 2306  ACCOUNT NO: [id number]                       ADMIT DATE: 2024  PROVIDER: Felix Styles MD      REFERRING PHYSICIAN:  LANE GRIFFIN    IDENTIFICATION:  This is a domiciled, never , and self-employed 49-year-old with a history of depression and anxiety, who self-presented to [facility] Emergency Department with worsening symptoms of depression and increasing thoughts of suicide.    SOURCES OF INFORMATION:  Patient.  Focused record review.    CHIEF COMPLAINT:  \"Thoughts of suicide.\"    HISTORY OF PRESENT ILLNESS:  The patient reports that over the last few months she has become increasingly depressed in the setting of a number of stressors.  She reports low mood, anhedonia, decreased motivation, trouble concentrating, self-reproach, feelings of excessive guilt, tearfulness, insomnia, fatigue, and thoughts of suicide.  She says she has had thoughts of suicide off and on for years, but over the last few months they have gotten more intense and yesterday she began planning for it.  She called her dad who encouraged her to go to the emergency department.    PSYCHIATRIC REVIEW OF SYSTEMS:  No christiane or psychosis.    STRESSORS:  GI issues. 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  She relapsed on alcohol 2 days ago after 6 months of sobriety.  Issues with her sons, 1 of who was recently in [facility].    PAST PSYCHIATRIC HISTORY:  No hospitalizations.  She has seen therapists off and on most of her life.  She saw a psychiatrist online a couple of x6 months ago.  Her PCP has diagnosed her with depression and anxiety.  She reports 1 suicide attempt at 19 years of age when she cut her wrist, but no 
period of time  - drank for 3 days straight prior to admission  - denies any symptoms of withdrawal, monitor     Tobacco Dependence  -Recommended cessation  - nicotine patch ordered PRN per patient request      Pt has no medical complaints at this time. They were informed that should a medical concern arise during their admission they may have [facility] contact us.        Zoila Escobar, NEISHA - CNP   7/25/2024 11:05 AM

## 2024-07-25 NOTE — PROGRESS NOTES
Group Therapy Note    Date: 7/25/2024  Start Time: 1300  End Time:  1400  Number of Participants: 4    Type of Group: Music and Spirituality    Patient's Goal:  Participation    Notes:   facilitated discussion on spirituality, focusing on connection, meaning, and hope, through the medium of music. Pt actively participated by making song selections and sharing reflections on songs.    Participation Level: Active Listener and Interactive    Participation Quality: Appropriate, Attentive, and Sharing      Speech:  normal      Affective Functioning: Congruent      Endings: None Reported    Modes of Intervention: Support, Socialization, Exploration, Activity, and Media      Discipline Responsible:       Signature:  Jhonatan Urena       07/25/24 1512   Encounter Summary   Encounter Overview/Reason Behavioral Health   Service Provided For Patient   Last Encounter    (7/25 Music and Spirituality Group)   Complexity of Encounter Moderate   Begin Time 1300   End Time  1400   Total Time Calculated 60 min   Behavioral Health    Type  Spirituality Group

## 2024-07-25 NOTE — PROGRESS NOTES
Behavioral Services  Medicare Certification Upon Admission    I certify that this patient's inpatient psychiatric hospital admission is medically necessary for:    [x] (1) Treatment which could reasonably be expected to improve this patient's condition,       [x] (2) Or for diagnostic study;     AND     [x](2) The inpatient psychiatric services are provided while the individual is under the care of a physician and are included in the individualized plan of care.    Estimated length of stay/service 3-5 days    Plan for post-hospital care incomplete     Electronically signed by ARCELIA THAPA MD on 7/25/2024 at 12:34 PM

## 2024-07-25 NOTE — PROGRESS NOTES
Patient was given trazodone 50 mg po, per request for sleep & commit lozenge 2 mg po fo nicotine craving. Beryl Abarca R.N.

## 2024-07-25 NOTE — GROUP NOTE
Group Therapy Note    Date: 7/24/2024    Group Start Time: 2015  Group End Time: 2035  Group Topic: Wrap-Up    [facility]    Beryl Abarca RN        Group Therapy Note    Attendees: 11       Patient's Goal:  To be admitted to the hospital    Notes:  Completed goal    Status After Intervention:  Improved    Participation Level: Active Listener    Participation Quality: Appropriate and Attentive      Speech:  normal and pressured      Thought Process/Content: Logical  Linear      Affective Functioning: Congruent      Mood: depressed      Level of consciousness:  Alert      Response to Learning: Able to verbalize current knowledge/experience and Able to verbalize/acknowledge new learning      Endings: None Reported    Modes of Intervention: Education and Support      Discipline Responsible: Registered Nurse      Signature:  Beryl Abarca RN

## 2024-07-26 PROCEDURE — 6370000000 HC RX 637 (ALT 250 FOR IP)

## 2024-07-26 PROCEDURE — 6370000000 HC RX 637 (ALT 250 FOR IP): Performed by: NURSE PRACTITIONER

## 2024-07-26 PROCEDURE — 1240000000 HC EMOTIONAL WELLNESS R&B

## 2024-07-26 PROCEDURE — 99233 SBSQ HOSP IP/OBS HIGH 50: CPT | Performed by: PSYCHIATRY & NEUROLOGY

## 2024-07-26 PROCEDURE — 6370000000 HC RX 637 (ALT 250 FOR IP): Performed by: PSYCHIATRY & NEUROLOGY

## 2024-07-26 RX ORDER — NICOTINE 21 MG/24HR
1 PATCH, TRANSDERMAL 24 HOURS TRANSDERMAL DAILY
Status: DISCONTINUED | OUTPATIENT
Start: 2024-07-26 | End: 2024-07-28 | Stop reason: HOSPADM

## 2024-07-26 RX ADMIN — NICOTINE POLACRILEX 2 MG: 2 LOZENGE ORAL at 12:47

## 2024-07-26 RX ADMIN — NICOTINE POLACRILEX 2 MG: 2 LOZENGE ORAL at 11:19

## 2024-07-26 RX ADMIN — TRAZODONE HYDROCHLORIDE 50 MG: 50 TABLET ORAL at 21:23

## 2024-07-26 RX ADMIN — NALTREXONE HYDROCHLORIDE 50 MG: 50 TABLET, FILM COATED ORAL at 08:19

## 2024-07-26 RX ADMIN — NICOTINE POLACRILEX 2 MG: 2 LOZENGE ORAL at 21:23

## 2024-07-26 RX ADMIN — FLUOXETINE HYDROCHLORIDE 40 MG: 20 CAPSULE ORAL at 08:19

## 2024-07-26 RX ADMIN — PANTOPRAZOLE SODIUM 40 MG: 40 TABLET, DELAYED RELEASE ORAL at 08:19

## 2024-07-26 RX ADMIN — NICOTINE POLACRILEX 2 MG: 2 LOZENGE ORAL at 08:19

## 2024-07-26 ASSESSMENT — LIFESTYLE VARIABLES
HOW OFTEN DO YOU HAVE A DRINK CONTAINING ALCOHOL: MONTHLY OR LESS
HOW MANY STANDARD DRINKS CONTAINING ALCOHOL DO YOU HAVE ON A TYPICAL DAY: 1 OR 2

## 2024-07-26 ASSESSMENT — PAIN SCALES - GENERAL
PAINLEVEL_OUTOF10: 0

## 2024-07-26 NOTE — GROUP NOTE
Group Therapy Note    Date: 7/26/2024    Group Start Time: 1110  Group End Time: 1155  Group Topic: Cognitive Skills    [facility]    Lola Partida LPC    Group members engaged in psychoeducation of somatic symptoms and stress reactions. Members explored \"fight, flight, freeze\" reactions and identified personal connections. Members discussed reacting vs.responding in moments of stress.     Group Therapy Note    Attendees: 6         Notes:  Janeth engaged throughout the duration of the group. Janeth shared that she feels that she typically is in a \"fight\" stress reaction. Janeth provided support to members and shared personal connection.     Status After Intervention:  Improved    Participation Level: Active Listener and Interactive    Participation Quality: Appropriate, Attentive, Sharing, and Supportive      Speech:  normal      Thought Process/Content: Linear      Affective Functioning: Congruent      Mood: depressed      Level of consciousness:  Alert      Response to Learning: Able to verbalize current knowledge/experience      Endings: None Reported    Modes of Intervention: Education, Support, Socialization, and Exploration      Discipline Responsible: /Counselor      Signature:  Lola Partida LPC

## 2024-07-26 NOTE — PROGRESS NOTES
Department of Psychiatry  Progress Note    Patient's chart was reviewed. Discussed with treatment team. Met with patient.     SUBJECTIVE:      Reports feeling less depressed today.    Remains anxious; especially when thinking about going home.    No signs/symptoms of etoh withdrawal.    Tolerating Naltrexone and increased dose of Prozac well so far.     Active in the milieu/programming.     ROS:   Patient has new complaints: no  Sleeping adequately:  Yes   Appetite adequate: Yes  Engaged in programming: Yes    OBJECTIVE:  VITALS:  BP (!) 102/56   Pulse 73   Temp 97.7 °F (36.5 °C) (Oral)   Resp 16   Ht 1.6 m (5' 2.99\")   Wt 70.8 kg (156 lb)   LMP 07/15/2024   SpO2 98%   BMI 27.64 kg/m²     Mental Status Examination:    Appearance: fair grooming and hygiene  Behavior/Attitude toward examiner:  cooperative, attentive and fair eye contact  Speech: Normal rate, volume, amount  Mood:  \"better\"  Affect:  mood congruent    Thought processes:  Goal directed, linear, no KENRICK or gross disorganization  Thought Content: less SI, no HI, no delusions voiced, no obsessions  Perceptions: no AVH  Attention: attention span and concentration were intact to interview   Abstraction: intact  Cognition:  Alert and oriented to person, place, time, and situation, recall intact  Insight: fair  Judgment: fair    Medication:  Scheduled:   pantoprazole  40 mg Oral Daily    FLUoxetine  40 mg Oral Daily    naltrexone  50 mg Oral Daily with breakfast        PRN:  acetaminophen, magnesium hydroxide, nicotine polacrilex, aluminum & magnesium hydroxide-simethicone, hydrOXYzine HCl, traZODone     FORMULATION:  This is a domiciled, never , and self-employed 49-year-old with history of depression and anxiety, who self-presented to [facility] Emergency Department with worsening depression and thoughts of suicide.  She meets criteria for major depressive disorder, recurrent, severe, without psychotic features; and alcohol use disorder,

## 2024-07-26 NOTE — GROUP NOTE
Group Therapy Note    Date: 7/26/2024    Group Start Time: 1000  Group End Time: 1045  Group Topic: Cognitive Skills    [facility]    Lola Hudson        Group Therapy Note    Attendees: 8    Group members were given a handout on Action Plans tied it into SMART Goals. Group members were asked to come up with a goal that was specific, measurable, achievable, realistic, and time limited. When finished completing the handout, group members were asked to share their goals.     Notes:  Patient attended group for the full duration. Patient remained engaged and interacted approrpiately with other members of the group.     Status After Intervention:  Improved    Participation Level: Active Listener and Interactive    Participation Quality: Appropriate, Attentive, and Sharing      Speech:  normal      Thought Process/Content: Logical      Affective Functioning: Congruent      Mood: euthymic      Level of consciousness:  Alert, Oriented x4, and Attentive      Response to Learning: Able to verbalize current knowledge/experience      Endings: None Reported    Modes of Intervention: Socialization, Exploration, and Activity      Discipline Responsible: Behavorial Health Tech      Signature:  MAGDALENE HARLEY

## 2024-07-27 PROCEDURE — 6370000000 HC RX 637 (ALT 250 FOR IP): Performed by: NURSE PRACTITIONER

## 2024-07-27 PROCEDURE — 6370000000 HC RX 637 (ALT 250 FOR IP)

## 2024-07-27 PROCEDURE — 6370000000 HC RX 637 (ALT 250 FOR IP): Performed by: PSYCHIATRY & NEUROLOGY

## 2024-07-27 PROCEDURE — 1240000000 HC EMOTIONAL WELLNESS R&B

## 2024-07-27 PROCEDURE — 99233 SBSQ HOSP IP/OBS HIGH 50: CPT

## 2024-07-27 RX ORDER — FLUOXETINE HYDROCHLORIDE 20 MG/1
60 CAPSULE ORAL DAILY
Status: DISCONTINUED | OUTPATIENT
Start: 2024-07-28 | End: 2024-07-28 | Stop reason: HOSPADM

## 2024-07-27 RX ADMIN — PANTOPRAZOLE SODIUM 40 MG: 40 TABLET, DELAYED RELEASE ORAL at 08:40

## 2024-07-27 RX ADMIN — FLUOXETINE HYDROCHLORIDE 40 MG: 20 CAPSULE ORAL at 08:40

## 2024-07-27 RX ADMIN — NICOTINE POLACRILEX 2 MG: 2 LOZENGE ORAL at 21:59

## 2024-07-27 RX ADMIN — NALTREXONE HYDROCHLORIDE 50 MG: 50 TABLET, FILM COATED ORAL at 08:40

## 2024-07-27 RX ADMIN — TRAZODONE HYDROCHLORIDE 50 MG: 50 TABLET ORAL at 21:59

## 2024-07-27 NOTE — PROGRESS NOTES
Department of Psychiatry  Progress Note    Patient's chart was reviewed. Discussed with treatment team. Met with patient.     SUBJECTIVE:    Pt up on the unit, social with peers.  She did not feel ready for discharge today, asking if Prozac can be increased.  Increased anxiety.  Pt states she knows she needs to face her life and stressors at home, but does not feel that today is that day.  Pt asking appropriate questions, considering IOP at [facility].      ROS:   Patient has new complaints: no  Sleeping adequately:  Yes   Appetite adequate: Yes  Engaged in programming: Yes    OBJECTIVE:  VITALS:  /62   Pulse 98   Temp 97.8 °F (36.6 °C) (Temporal)   Resp 18   Ht 1.6 m (5' 2.99\")   Wt 70.8 kg (156 lb)   LMP 07/15/2024   SpO2 96%   BMI 27.64 kg/m²     Mental Status Examination:    Appearance: fair grooming and hygiene  Behavior/Attitude toward examiner:  cooperative, attentive and fair eye contact  Speech: Normal rate, volume, amount  Mood:  \"better\"  Affect:  mood congruent    Thought processes:  Goal directed, linear, no KENRICK or gross disorganization  Thought Content: less SI, no HI, no delusions voiced, no obsessions  Perceptions: no AVH  Attention: attention span and concentration were intact to interview   Abstraction: intact  Cognition:  Alert and oriented to person, place, time, and situation, recall intact  Insight: fair  Judgment: fair    Medication:  Scheduled:   [START ON 7/28/2024] FLUoxetine  60 mg Oral Daily    nicotine  1 patch TransDERmal Daily    pantoprazole  40 mg Oral Daily    naltrexone  50 mg Oral Daily with breakfast        PRN:  acetaminophen, magnesium hydroxide, nicotine polacrilex, aluminum & magnesium hydroxide-simethicone, hydrOXYzine HCl, traZODone     FORMULATION:  This is a domiciled, never , and self-employed 49-year-old with history of depression and anxiety, who self-presented to [facility] Emergency Department with worsening depression and thoughts of suicide.

## 2024-07-28 VITALS
OXYGEN SATURATION: 96 % | HEART RATE: 94 BPM | SYSTOLIC BLOOD PRESSURE: 99 MMHG | DIASTOLIC BLOOD PRESSURE: 69 MMHG | WEIGHT: 156 LBS | RESPIRATION RATE: 16 BRPM | TEMPERATURE: 96.8 F | HEIGHT: 63 IN | BODY MASS INDEX: 27.64 KG/M2

## 2024-07-28 PROCEDURE — 6370000000 HC RX 637 (ALT 250 FOR IP): Performed by: NURSE PRACTITIONER

## 2024-07-28 PROCEDURE — 99239 HOSP IP/OBS DSCHRG MGMT >30: CPT

## 2024-07-28 PROCEDURE — 5130000000 HC BRIDGE APPOINTMENT

## 2024-07-28 PROCEDURE — 6370000000 HC RX 637 (ALT 250 FOR IP): Performed by: PSYCHIATRY & NEUROLOGY

## 2024-07-28 PROCEDURE — 6370000000 HC RX 637 (ALT 250 FOR IP)

## 2024-07-28 RX ORDER — TRAZODONE HYDROCHLORIDE 50 MG/1
50 TABLET ORAL NIGHTLY PRN
Qty: 30 TABLET | Refills: 0 | Status: SHIPPED | OUTPATIENT
Start: 2024-07-28

## 2024-07-28 RX ORDER — FLUOXETINE HYDROCHLORIDE 20 MG/1
60 CAPSULE ORAL DAILY
Qty: 90 CAPSULE | Refills: 0 | Status: SHIPPED | OUTPATIENT
Start: 2024-07-29

## 2024-07-28 RX ORDER — NALTREXONE HYDROCHLORIDE 50 MG/1
50 TABLET, FILM COATED ORAL
Qty: 30 TABLET | Refills: 0 | Status: SHIPPED | OUTPATIENT
Start: 2024-07-29

## 2024-07-28 RX ADMIN — PANTOPRAZOLE SODIUM 40 MG: 40 TABLET, DELAYED RELEASE ORAL at 09:26

## 2024-07-28 RX ADMIN — NALTREXONE HYDROCHLORIDE 50 MG: 50 TABLET, FILM COATED ORAL at 09:26

## 2024-07-28 RX ADMIN — FLUOXETINE HYDROCHLORIDE 60 MG: 20 CAPSULE ORAL at 09:26

## 2024-07-28 NOTE — BH NOTE
Bridge Appointment completed: Reviewed Discharge Instructions with patient.    Patient verbalizes understanding and agreement with the discharge plan using the teachback method.     Referral for Outpatient Tobacco Cessation Counseling, upon discharge (stephenie X if applicable and completed):    ( )  Hospital staff assisted patient to call Quit Line or faxed referral                                          during hospitalization     ( )  Appointment/fax referral made with [facility]                                         before discharge                  ( X)  Patient refused referral    Vaccinations (stephenie X if applicable and completed):  ( ) Patient states already received influenza vaccine elsewhere  ( ) Patient received influenza vaccine during this hospitalization  ( ) Patient refused influenza vaccine at this time   (X) Not Offered  [facility]  Discharge Note    Pt discharged with followings belongings:   Dental Appliances: None  Vision - Corrective Lenses: None  Hearing Aid: None  Jewelry: None  Body Piercings Removed: N/A  Clothing: Footwear, Undergarments, Shirt, Pants  Other Valuables: Money, Purse, Wallet, Keys, Credit/Debit Card, Personal Toiletries ($279 in bills, visa card, mastercard, 5/3rd debit cards x2, $7.80 in change)   Valuables returned to patient. Patient educated on aftercare instructions: Yes Patient verbalize understanding of AVS:  Yes.    Status EXAM upon discharge:  Mental Status and Behavioral Exam  Normal: No  Level of Assistance: Independent/Self  Facial Expression: Brightened  Affect: Congruent  Level of Consciousness: Alert  Frequency of Checks: 4 times per hour, close  Mood:Normal: No  Mood: Depressed, Anxious  Motor Activity:Normal: Yes  Motor Activity: Increased  Eye Contact: Good  Observed Behavior: Friendly, Cooperative  Sexual Misconduct History: Current - no  Preception: [place] to person, [place] to time, [place] to place, [place] to

## 2024-07-28 NOTE — TRANSITION OF CARE
DISCHARGE.    Date and time of appointment: 7/30/2024 @ 8:30 am  The type/s of services requested are: outpatient rehab and recovery services      Special instructions (what to bring to appointment, etc.): Please bring two forms of ID with you and insurance information. Please bring prescriptions in original bottles. If wanting medication-assisted treatment, please pack a lunch as the induction takes several hours. [address] [place], [place] [address]   [phone]  Fax- [phone]    PCP  Call You do not currently have a primary care provider (PCP). If you would like help getting connected with a PCP, you can contact [employer] for help finding one in your area and getting scheduled for your first appointment. [employer]  Phone: [phone]             Advanced Directive:   Does the patient have an appointed surrogate decision maker? No  Does the patient have a Medical Advance Directive? No  Does the patient have a Psychiatric Advance Directive? No  If the patient does not have a surrogate or Medical Advance Directive AND Psychiatric Advance Directive, the patient was offered information on these advance directives Patient will complete at a later time    Patient Instructions: Please continue all medications until otherwise directed by physician.  [phone]    Tobacco Cessation Discharge Plan:   Is the patient a tobacco user  and needs referral for tobacco cessation? No  Patient referred to the following for tobacco cessation with an appointment? No  Patient was offered medication to assist with tobacco cessation at discharge? No    Alcohol/Substance Abuse Discharge Plan:   Does the patient have a history of substance/alcohol abuse and requires a referral for treatment? No  Patient referred to the following for substance/alcohol abuse treatment with an appointment? No  Patient was offered medication to assist with substance/alcohol abuse cessation at discharge?

## 2024-07-28 NOTE — PLAN OF CARE
Problem: Self Harm/Suicidality  Goal: Will have no self-injury during hospital stay  Description: INTERVENTIONS:  1.  Ensure constant observer at bedside with Q15M safety checks  2.  Maintain a safe environment  3.  Secure patient belongings  4.  Ensure family/visitors adhere to safety recommendations  5.  Ensure safety tray has been added to patient's diet order  6.  Every shift and PRN: Re-assess suicidal risk via Frequent Screener    7/24/2024 2322 by Beryl Abarca RN  Outcome: Progressing  7/24/2024 1331 by Yudy Valdez RN  Outcome: Progressing     Problem: Anxiety  Goal: Will report anxiety at manageable levels  Description: INTERVENTIONS:  1. Administer medication as ordered  2. Teach and rehearse alternative coping skills  3. Provide emotional support with 1:1 interaction with staff  Outcome: Progressing     Problem: Coping  Goal: Pt/Family able to verbalize concerns and demonstrate effective coping strategies  Description: INTERVENTIONS:  1. Assist patient/family to identify coping skills, available support systems and cultural and spiritual values  2. Provide emotional support, including active listening and acknowledgement of concerns of patient and caregivers  3. Reduce environmental stimuli, as able  4. Instruct patient/family in relaxation techniques, as appropriate  5. Assess for spiritual pain/suffering and initiate Spiritual Care, Psychosocial Clinical Specialist consults as needed  Outcome: Progressing     Problem: Depression/Self Harm  Goal: Effect of psychiatric condition will be minimized and patient will be protected from self harm  Description: INTERVENTIONS:  1. Assess impact of patient's symptoms on level of functioning, self care needs and offer support as indicated  2. Assess patient/family knowledge of depression, impact on illness and need for teaching  3. Provide emotional support, presence and reassurance  4. Assess for possible suicidal thoughts or ideation. If patient expresses 
  Problem: Self Harm/Suicidality  Goal: Will have no self-injury during hospital stay  Description: INTERVENTIONS:  1.  Ensure constant observer at bedside with Q15M safety checks  2.  Maintain a safe environment  3.  Secure patient belongings  4.  Ensure family/visitors adhere to safety recommendations  5.  Ensure safety tray has been added to patient's diet order  6.  Every shift and PRN: Re-assess suicidal risk via Frequent Screener    7/27/2024 2304 by Olga Ontiveros RN  Outcome: Progressing     Problem: Anxiety  Goal: Will report anxiety at manageable levels  Description: INTERVENTIONS:  1. Administer medication as ordered  2. Teach and rehearse alternative coping skills  3. Provide emotional support with 1:1 interaction with staff  7/27/2024 2304 by Olga Ontiveros RN  Outcome: Progressing     Problem: Coping  Goal: Pt/Family able to verbalize concerns and demonstrate effective coping strategies  Description: INTERVENTIONS:  1. Assist patient/family to identify coping skills, available support systems and cultural and spiritual values  2. Provide emotional support, including active listening and acknowledgement of concerns of patient and caregivers  3. Reduce environmental stimuli, as able  4. Instruct patient/family in relaxation techniques, as appropriate  5. Assess for spiritual pain/suffering and initiate Spiritual Care, Psychosocial Clinical Specialist consults as needed  Outcome: Progressing     Problem: Depression/Self Harm  Goal: Effect of psychiatric condition will be minimized and patient will be protected from self harm  Description: INTERVENTIONS:  1. Assess impact of patient's symptoms on level of functioning, self care needs and offer support as indicated  2. Assess patient/family knowledge of depression, impact on illness and need for teaching  3. Provide emotional support, presence and reassurance  4. Assess for possible suicidal thoughts or ideation. If patient expresses suicidal 
  Problem: Self Harm/Suicidality  Goal: Will have no self-injury during hospital stay  Description: INTERVENTIONS:  1.  Ensure constant observer at bedside with Q15M safety checks  2.  Maintain a safe environment  3.  Secure patient belongings  4.  Ensure family/visitors adhere to safety recommendations  5.  Ensure safety tray has been added to patient's diet order  6.  Every shift and PRN: Re-assess suicidal risk via Frequent Screener    7/27/2024 2304 by Olga Ontiveros RN  Outcome: Progressing     Problem: Self Harm/Suicidality  Goal: Will have no self-injury during hospital stay  Description: INTERVENTIONS:  1.  Ensure constant observer at bedside with Q15M safety checks  2.  Maintain a safe environment  3.  Secure patient belongings  4.  Ensure family/visitors adhere to safety recommendations  5.  Ensure safety tray has been added to patient's diet order  6.  Every shift and PRN: Re-assess suicidal risk via Frequent Screener    7/27/2024 2304 by Olga Ontiveros RN  Outcome: Progressing     Problem: Anxiety  Goal: Will report anxiety at manageable levels  Description: INTERVENTIONS:  1. Administer medication as ordered  2. Teach and rehearse alternative coping skills  3. Provide emotional support with 1:1 interaction with staff  Recent Flowsheet Documentation  Taken 7/28/2024 1016 by Rica Young RN  Will report anxiety at manageable levels:   Teach and rehearse alternative coping skills   Provide emotional support with 1:1 interaction with staff   Administer medication as ordered  7/27/2024 2304 by Olga Ontiveros RN  Outcome: Progressing     Problem: Coping  Goal: Pt/Family able to verbalize concerns and demonstrate effective coping strategies  Description: INTERVENTIONS:  1. Assist patient/family to identify coping skills, available support systems and cultural and spiritual values  2. Provide emotional support, including active listening and acknowledgement of concerns of patient and 
  Problem: Self Harm/Suicidality  Goal: Will have no self-injury during hospital stay  Description: INTERVENTIONS:  1.  Ensure constant observer at bedside with Q15M safety checks  2.  Maintain a safe environment  3.  Secure patient belongings  4.  Ensure family/visitors adhere to safety recommendations  5.  Ensure safety tray has been added to patient's diet order  6.  Every shift and PRN: Re-assess suicidal risk via Frequent Screener    Outcome: Progressing     Problem: Anxiety  Goal: Will report anxiety at manageable levels  Description: INTERVENTIONS:  1. Administer medication as ordered  2. Teach and rehearse alternative coping skills  3. Provide emotional support with 1:1 interaction with staff  Outcome: Progressing  Flowsheets (Taken 7/25/2024 1000)  Will report anxiety at manageable levels:   Administer medication as ordered   Provide emotional support with 1:1 interaction with staff   Teach and rehearse alternative coping skills     Problem: Coping  Goal: Pt/Family able to verbalize concerns and demonstrate effective coping strategies  Description: INTERVENTIONS:  1. Assist patient/family to identify coping skills, available support systems and cultural and spiritual values  2. Provide emotional support, including active listening and acknowledgement of concerns of patient and caregivers  3. Reduce environmental stimuli, as able  4. Instruct patient/family in relaxation techniques, as appropriate  5. Assess for spiritual pain/suffering and initiate Spiritual Care, Psychosocial Clinical Specialist consults as needed  Outcome: Progressing  Note: Janeth is future oriented. Attending groups to identify positive coping patterns.     Problem: Depression/Self Harm  Goal: Effect of psychiatric condition will be minimized and patient will be protected from self harm  Description: INTERVENTIONS:  1. Assess impact of patient's symptoms on level of functioning, self care needs and offer support as indicated  2. Assess 
  Problem: Self Harm/Suicidality  Goal: Will have no self-injury during hospital stay  Description: INTERVENTIONS:  1.  Ensure constant observer at bedside with Q15M safety checks  2.  Maintain a safe environment  3.  Secure patient belongings  4.  Ensure family/visitors adhere to safety recommendations  5.  Ensure safety tray has been added to patient's diet order  6.  Every shift and PRN: Re-assess suicidal risk via Frequent Screener    Outcome: Progressing   Patient pleasant and cooperative. Patient future oriented and denies Suicidal thoughts.   
Janeth has been visible in the dayroom and social with her peers this shift. She attended the weekly Alcoholics Anonymous group and had a snack afterwards. She didn't have any scheduled nightly medications, but did request PRN Trazodone 50 mg PO to help with sleep. Janeth was bright, pleasant, and had a good sense of humor. She joked and laughed with this writer and other staff. She reports that she has been impressed with the care that she has received. Janeth appeared to have slept well throughout the night. She denies SI, HI, and AVH.     Problem: Anxiety  Goal: Will report anxiety at manageable levels  Description: INTERVENTIONS:  1. Administer medication as ordered  2. Teach and rehearse alternative coping skills  3. Provide emotional support with 1:1 interaction with staff  7/26/2024 2210 by Shannon Machuca RN  Outcome: Progressing     Problem: Coping  Goal: Pt/Family able to verbalize concerns and demonstrate effective coping strategies  Description: INTERVENTIONS:  1. Assist patient/family to identify coping skills, available support systems and cultural and spiritual values  2. Provide emotional support, including active listening and acknowledgement of concerns of patient and caregivers  3. Reduce environmental stimuli, as able  4. Instruct patient/family in relaxation techniques, as appropriate  5. Assess for spiritual pain/suffering and initiate Spiritual Care, Psychosocial Clinical Specialist consults as needed  7/26/2024 2210 by Shannon Machuca RN  Outcome: Progressing     Problem: Depression/Self Harm  Goal: Effect of psychiatric condition will be minimized and patient will be protected from self harm  Description: INTERVENTIONS:  1. Assess impact of patient's symptoms on level of functioning, self care needs and offer support as indicated  2. Assess patient/family knowledge of depression, impact on illness and need for teaching  3. Provide emotional support, presence and reassurance  4. Assess for 
Patient is alert and oriented x 4. Takes medications whole with water and without issue.  Patient is independent and continent. Calm, friendly and cooperative with staff. Is visible on unit, playing cards with her peers in dayroom.  Interacting with peers appropriately. Denies AH/VH/SI/HI and no RTIS noted.  No signs or symptoms of distress. Patient denies pain at this time.       Problem: Self Harm/Suicidality  Goal: Will have no self-injury during hospital stay  Description: INTERVENTIONS:  1.  Ensure constant observer at bedside with Q15M safety checks  2.  Maintain a safe environment  3.  Secure patient belongings  4.  Ensure family/visitors adhere to safety recommendations  5.  Ensure safety tray has been added to patient's diet order  6.  Every shift and PRN: Re-assess suicidal risk via Frequent Screener    Outcome: Progressing     Problem: Anxiety  Goal: Will report anxiety at manageable levels  Description: INTERVENTIONS:  1. Administer medication as ordered  2. Teach and rehearse alternative coping skills  3. Provide emotional support with 1:1 interaction with staff  7/27/2024 0925 by Tami Resendiz RN  Outcome: Progressing  7/26/2024 2210 by Shannon Machuca RN  Outcome: Progressing     Problem: Coping  Goal: Pt/Family able to verbalize concerns and demonstrate effective coping strategies  Description: INTERVENTIONS:  1. Assist patient/family to identify coping skills, available support systems and cultural and spiritual values  2. Provide emotional support, including active listening and acknowledgement of concerns of patient and caregivers  3. Reduce environmental stimuli, as able  4. Instruct patient/family in relaxation techniques, as appropriate  5. Assess for spiritual pain/suffering and initiate Spiritual Care, Psychosocial Clinical Specialist consults as needed  7/26/2024 2210 by Shannon Machuca RN  Outcome: Progressing     Problem: Depression/Self Harm  Goal: Effect of psychiatric condition 
relaxation techniques, as appropriate  5. Assess for spiritual pain/suffering and initiate Spiritual Care, Psychosocial Clinical Specialist consults as needed  7/25/2024 2115 by Shannon Machuca RN  Outcome: Progressing     Problem: Depression/Self Harm  Goal: Effect of psychiatric condition will be minimized and patient will be protected from self harm  Description: INTERVENTIONS:  1. Assess impact of patient's symptoms on level of functioning, self care needs and offer support as indicated  2. Assess patient/family knowledge of depression, impact on illness and need for teaching  3. Provide emotional support, presence and reassurance  4. Assess for possible suicidal thoughts or ideation. If patient expresses suicidal thoughts or statements do not leave alone, initiate Suicide Precautions, move to a room close to the nursing station and obtain sitter  5. Initiate consults as appropriate with Mental Health Professional, Spiritual Care, Psychosocial CNS, and consider a recommendation to the [facility] for a Psychiatric Consultation  7/25/2024 2115 by Shannon Mahcuca RN  Outcome: Progressing     Problem: Drug Abuse/Detox  Goal: Will have no detox symptoms and will verbalize plan for changing drug-related behavior  Description: INTERVENTIONS:  1. Administer medication as ordered  2. Monitor physical status  3. Provide emotional support with 1:1 interaction with staff  4. Encourage  recovery focused treatment   7/25/2024 2115 by Shannon Machuca RN  Outcome: Progressing     
Shruthi, RN  Outcome: Progressing  Flowsheets  Taken 7/26/2024 1002  Will have no detox symptoms and will verbalize plan for changing drug-related behavior: Monitor physical status  Taken 7/26/2024 0955  Will have no detox symptoms and will verbalize plan for changing drug-related behavior: Provide emotional support with 1:1 interaction with staff  7/25/2024 2115 by Shannon Machuca, RN  Outcome: Progressing     Problem: Safety - Adult  Goal: Free from fall injury  Outcome: Progressing

## 2024-07-28 NOTE — GROUP NOTE
Group Therapy Note    Date: 7/28/2024    Group Start Time: 0945  Group End Time: 1100  Group Topic: Cognitive Skills    [facility]    Lola Partida LPC    Group members engaged in group to complete both morning meeting and morning group. Members discussed goal for the day, then transitioned to informational group. Members engaged in understanding the CBT triangle (situation, thought, emotion, action) and how cognitive distortions can affect the initial thought. Members were able to identify initial thought and practiced creating a more positive thought.     Group Therapy Note    Attendees: 7       Notes:  Janeth engaged throughout the group. She was able to identify connections between thoughts, emotions and actions. Janeth practiced finding a \"more positive initial thought\". Janeth shared feedback appropriately.     Status After Intervention:  Improved    Participation Level: Active Listener and Interactive    Participation Quality: Appropriate, Attentive, Sharing, and Supportive      Speech:  normal      Thought Process/Content: Logical      Affective Functioning: Congruent      Mood: euthymic      Level of consciousness:  Alert and Oriented x4      Response to Learning: Able to verbalize current knowledge/experience, Able to verbalize/acknowledge new learning, Able to retain information, and Capable of insight      Endings: None Reported    Modes of Intervention: Education, Support, Socialization, and Exploration      Discipline Responsible: /Counselor      Signature:  Lola Partida LPC

## 2024-07-30 ENCOUNTER — FOLLOWUP TELEPHONE ENCOUNTER (OUTPATIENT)
Dept: PSYCHIATRY | Age: 49
End: 2024-07-30

## (undated) DEVICE — NEEDLE HYPO 23GA L1.5IN TURQ POLYPR HUB S STL THN WALL IM

## (undated) DEVICE — BIT DRL DIA2.5MM FOR ANK FRAC MGMT SYS

## (undated) DEVICE — GLOVE SURG SZ 8 CRM LTX FREE POLYISOPRENE POLYMER BEAD ANTI

## (undated) DEVICE — SUTURE VCRL + SZ 3-0 L18IN ABSRB UD SH 1/2 CIR TAPERCUT NDL VCP864D

## (undated) DEVICE — C-ARM: Brand: UNBRANDED

## (undated) DEVICE — BANDAGE COMPR W6INXL12FT SMOOTH FOR LIMB EXSANG ESMARCH

## (undated) DEVICE — SOLUTION IV IRRIG POUR BRL 0.9% SODIUM CHL 2F7124

## (undated) DEVICE — SUTURE VCRL + SZ 0 L18IN ABSRB UD L36MM CT-1 1/2 CIR VCP840D

## (undated) DEVICE — GLOVE SURG SZ 65 L12IN FNGR THK79MIL GRN LTX FREE

## (undated) DEVICE — 3M™ STERI-STRIP™ COMPOUND BENZOIN TINCTURE 40 BAGS/CARTON 4 CARTONS/CASE C1544: Brand: 3M™ STERI-STRIP™

## (undated) DEVICE — BIT DRL DIA3.5MM TRIM-IT

## (undated) DEVICE — GLOVE SURG SZ 65 THK91MIL LTX FREE SYN POLYISOPRENE

## (undated) DEVICE — 3M™ STERI-DRAPE™ U-DRAPE 1015: Brand: STERI-DRAPE™

## (undated) DEVICE — STRIP,CLOSURE,WOUND,MEDI-STRIP,1/2X4: Brand: MEDLINE

## (undated) DEVICE — LOWER EXTREMITY: Brand: MEDLINE INDUSTRIES, INC.

## (undated) DEVICE — BANDAGE COMPR W4INXL15FT BGE E SGL LAYERED CLP CLSR

## (undated) DEVICE — GUIDEWIRE ORTH L150MM DIA0.053IN W/ TRCR TIP FOR ANK FRAC

## (undated) DEVICE — DRESSING,GAUZE,XEROFORM,CURAD,5"X9",ST: Brand: CURAD

## (undated) DEVICE — GOWN SIRUS NONREIN XL W/TWL: Brand: MEDLINE INDUSTRIES, INC.

## (undated) DEVICE — BANDAGE COMPR W3INXL15FT BGE E SGL LAYERED CLP CLSR

## (undated) DEVICE — Device

## (undated) DEVICE — BIT DRL DIA2.5MM LNG GRAD FOR ANK FRAC MGMT SYS

## (undated) DEVICE — SUTURE MCRYL + SZ 4-0 L18IN ABSRB UD L19MM PS-2 3/8 CIR MCP496G

## (undated) DEVICE — SPONGE LAP W18XL18IN WHT COT 4 PLY FLD STRUNG RADPQ DISP ST

## (undated) DEVICE — COTTON UNDERCAST PADDING,CRIMPED FINISH: Brand: WEBRIL

## (undated) DEVICE — BLADE ES ELASTOMERIC COAT INSUL DURABLE BEND UPTO 90DEG

## (undated) DEVICE — SUTURE VCRL + SZ 2-0 L18IN ABSRB UD CT1 L36MM 1/2 CIR VCP839D

## (undated) DEVICE — STERILE TOTAL HIP DRAPE PK: Brand: CARDINAL HEALTH

## (undated) DEVICE — 3M™ COBAN™ NL STERILE NON-LATEX SELF-ADHERENT WRAP, 2086S, 6 IN X 5 YD (15 CM X 4,5 M), 12 ROLLS/CASE: Brand: 3M™ COBAN™